# Patient Record
Sex: FEMALE | Race: BLACK OR AFRICAN AMERICAN | Employment: OTHER | URBAN - METROPOLITAN AREA
[De-identification: names, ages, dates, MRNs, and addresses within clinical notes are randomized per-mention and may not be internally consistent; named-entity substitution may affect disease eponyms.]

---

## 2017-01-21 ENCOUNTER — IP HISTORICAL/CONVERTED ENCOUNTER (OUTPATIENT)
Dept: OTHER | Age: 77
End: 2017-01-21

## 2017-03-17 ENCOUNTER — OP HISTORICAL/CONVERTED ENCOUNTER (OUTPATIENT)
Dept: OTHER | Age: 77
End: 2017-03-17

## 2017-12-21 ENCOUNTER — OP HISTORICAL/CONVERTED ENCOUNTER (OUTPATIENT)
Dept: OTHER | Age: 77
End: 2017-12-21

## 2018-03-19 ENCOUNTER — OP HISTORICAL/CONVERTED ENCOUNTER (OUTPATIENT)
Dept: OTHER | Age: 78
End: 2018-03-19

## 2018-12-21 ENCOUNTER — OP HISTORICAL/CONVERTED ENCOUNTER (OUTPATIENT)
Dept: OTHER | Age: 78
End: 2018-12-21

## 2019-03-23 ENCOUNTER — OP HISTORICAL/CONVERTED ENCOUNTER (OUTPATIENT)
Dept: OTHER | Age: 79
End: 2019-03-23

## 2019-10-03 ENCOUNTER — OP HISTORICAL/CONVERTED ENCOUNTER (OUTPATIENT)
Dept: OTHER | Age: 79
End: 2019-10-03

## 2019-10-21 ENCOUNTER — OP HISTORICAL/CONVERTED ENCOUNTER (OUTPATIENT)
Dept: OTHER | Age: 79
End: 2019-10-21

## 2019-11-04 ENCOUNTER — OP HISTORICAL/CONVERTED ENCOUNTER (OUTPATIENT)
Dept: OTHER | Age: 79
End: 2019-11-04

## 2019-12-02 ENCOUNTER — OP HISTORICAL/CONVERTED ENCOUNTER (OUTPATIENT)
Dept: OTHER | Age: 79
End: 2019-12-02

## 2019-12-20 ENCOUNTER — OP HISTORICAL/CONVERTED ENCOUNTER (OUTPATIENT)
Dept: OTHER | Age: 79
End: 2019-12-20

## 2020-07-06 ENCOUNTER — OP HISTORICAL/CONVERTED ENCOUNTER (OUTPATIENT)
Dept: OTHER | Age: 80
End: 2020-07-06

## 2021-01-06 ENCOUNTER — TRANSCRIBE ORDER (OUTPATIENT)
Dept: SCHEDULING | Age: 81
End: 2021-01-06

## 2021-01-06 DIAGNOSIS — Z12.31 VISIT FOR SCREENING MAMMOGRAM: Primary | ICD-10-CM

## 2021-01-12 ENCOUNTER — HOSPITAL ENCOUNTER (OUTPATIENT)
Dept: MAMMOGRAPHY | Age: 81
Discharge: HOME | End: 2021-01-12
Attending: INTERNAL MEDICINE
Payer: MEDICARE

## 2021-01-12 DIAGNOSIS — Z12.31 VISIT FOR SCREENING MAMMOGRAM: ICD-10-CM

## 2021-01-12 PROCEDURE — 77063 BREAST TOMOSYNTHESIS BI: CPT

## 2021-08-17 RX ORDER — SIMVASTATIN 40 MG/1
40 TABLET, FILM COATED ORAL
Qty: 90 TABLET | Refills: 0 | Status: SHIPPED | OUTPATIENT
Start: 2021-08-17 | End: 2021-12-09

## 2021-10-16 PROBLEM — Z95.0 CARDIAC PACEMAKER IN SITU: Status: ACTIVE | Noted: 2021-10-16

## 2021-10-16 PROBLEM — G62.9 NEUROPATHY: Status: ACTIVE | Noted: 2021-10-16

## 2021-10-16 PROBLEM — Z85.09 HISTORY OF GASTROINTESTINAL STROMAL TUMOR (GIST): Status: ACTIVE | Noted: 2021-10-16

## 2021-10-16 PROBLEM — E66.01 MORBID OBESITY (HCC): Status: ACTIVE | Noted: 2021-10-16

## 2021-10-16 PROBLEM — E78.00 PURE HYPERCHOLESTEROLEMIA: Status: ACTIVE | Noted: 2021-10-16

## 2021-10-16 PROBLEM — I10 PRIMARY HYPERTENSION: Status: ACTIVE | Noted: 2021-10-16

## 2021-10-16 PROBLEM — M15.9 PRIMARY OSTEOARTHRITIS INVOLVING MULTIPLE JOINTS: Status: ACTIVE | Noted: 2021-10-16

## 2021-10-16 PROBLEM — I25.2 HISTORY OF NON-ST ELEVATION MYOCARDIAL INFARCTION (NSTEMI): Status: ACTIVE | Noted: 2021-10-16

## 2021-10-16 PROBLEM — Z85.3 HISTORY OF BREAST CANCER: Status: ACTIVE | Noted: 2021-10-16

## 2021-10-21 ENCOUNTER — OFFICE VISIT (OUTPATIENT)
Dept: INTERNAL MEDICINE CLINIC | Age: 81
End: 2021-10-21
Payer: MEDICARE

## 2021-10-21 VITALS
OXYGEN SATURATION: 98 % | DIASTOLIC BLOOD PRESSURE: 82 MMHG | SYSTOLIC BLOOD PRESSURE: 118 MMHG | BODY MASS INDEX: 40.32 KG/M2 | RESPIRATION RATE: 12 BRPM | HEART RATE: 72 BPM | WEIGHT: 242 LBS | HEIGHT: 65 IN

## 2021-10-21 DIAGNOSIS — R63.5 WEIGHT GAIN: ICD-10-CM

## 2021-10-21 DIAGNOSIS — G62.9 NEUROPATHY: ICD-10-CM

## 2021-10-21 DIAGNOSIS — E66.01 MORBID OBESITY (HCC): ICD-10-CM

## 2021-10-21 DIAGNOSIS — M15.9 PRIMARY OSTEOARTHRITIS INVOLVING MULTIPLE JOINTS: ICD-10-CM

## 2021-10-21 DIAGNOSIS — Z95.0 CARDIAC PACEMAKER IN SITU: ICD-10-CM

## 2021-10-21 DIAGNOSIS — I25.2 HISTORY OF NON-ST ELEVATION MYOCARDIAL INFARCTION (NSTEMI): ICD-10-CM

## 2021-10-21 DIAGNOSIS — I10 PRIMARY HYPERTENSION: ICD-10-CM

## 2021-10-21 DIAGNOSIS — Z23 NEEDS FLU SHOT: ICD-10-CM

## 2021-10-21 DIAGNOSIS — E78.00 PURE HYPERCHOLESTEROLEMIA: ICD-10-CM

## 2021-10-21 DIAGNOSIS — E55.9 VITAMIN D DEFICIENCY: ICD-10-CM

## 2021-10-21 DIAGNOSIS — Z85.3 HISTORY OF BREAST CANCER: ICD-10-CM

## 2021-10-21 DIAGNOSIS — Z85.09 HISTORY OF GASTROINTESTINAL STROMAL TUMOR (GIST): ICD-10-CM

## 2021-10-21 PROCEDURE — 1090F PRES/ABSN URINE INCON ASSESS: CPT | Performed by: INTERNAL MEDICINE

## 2021-10-21 PROCEDURE — G8536 NO DOC ELDER MAL SCRN: HCPCS | Performed by: INTERNAL MEDICINE

## 2021-10-21 PROCEDURE — G8510 SCR DEP NEG, NO PLAN REQD: HCPCS | Performed by: INTERNAL MEDICINE

## 2021-10-21 PROCEDURE — G8427 DOCREV CUR MEDS BY ELIG CLIN: HCPCS | Performed by: INTERNAL MEDICINE

## 2021-10-21 PROCEDURE — 1101F PT FALLS ASSESS-DOCD LE1/YR: CPT | Performed by: INTERNAL MEDICINE

## 2021-10-21 PROCEDURE — G8417 CALC BMI ABV UP PARAM F/U: HCPCS | Performed by: INTERNAL MEDICINE

## 2021-10-21 PROCEDURE — G8400 PT W/DXA NO RESULTS DOC: HCPCS | Performed by: INTERNAL MEDICINE

## 2021-10-21 PROCEDURE — 90756 CCIIV4 VACC ABX FREE IM: CPT | Performed by: INTERNAL MEDICINE

## 2021-10-21 PROCEDURE — 99214 OFFICE O/P EST MOD 30 MIN: CPT | Performed by: INTERNAL MEDICINE

## 2021-10-21 RX ORDER — LOSARTAN POTASSIUM 25 MG/1
25 TABLET ORAL DAILY
COMMUNITY
End: 2022-10-21

## 2021-10-21 RX ORDER — ASPIRIN 81 MG/1
81 TABLET ORAL DAILY
COMMUNITY

## 2021-10-21 RX ORDER — BISMUTH SUBSALICYLATE 262 MG
1 TABLET,CHEWABLE ORAL DAILY
COMMUNITY

## 2021-10-21 RX ORDER — CARVEDILOL 12.5 MG/1
12.5 TABLET ORAL 2 TIMES DAILY WITH MEALS
COMMUNITY

## 2021-10-21 RX ORDER — GABAPENTIN 300 MG/1
300 CAPSULE ORAL 4 TIMES DAILY
COMMUNITY
End: 2022-10-21

## 2021-10-21 NOTE — PROGRESS NOTES
Augusta Dean is a 78 y.o. female and presents with Follow Up Chronic Condition, Hypertension, and Cholesterol Problem    Ms. 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 No depression. she is breast cancer survivor she has pacemaker, due to history of any blockage in the past and also having history of longstanding Ms. She has some weight gain and history of vitamin D deficiency she wants to have flu vaccine. She is feeling little drive is up to date for  Covid vaccine. Review of Systems    Review of Systems   Constitutional: Negative. Weight gain   HENT: Negative for sinus pain and sore throat. Eyes: Negative for blurred vision. Respiratory: Negative for cough, shortness of breath and wheezing. Cardiovascular: Negative. Gastrointestinal: Negative. Genitourinary: Negative for frequency and hematuria. Musculoskeletal: Negative for joint pain. Skin: Negative for rash. Neurological: Negative for dizziness, tingling, tremors, sensory change, speech change and headaches. H/o neuropathy after taken chemo in past.   Psychiatric/Behavioral: Negative for depression, substance abuse and suicidal ideas. The patient is not nervous/anxious.          Past Medical History:   Diagnosis Date    Breast cancer ([facility] [address]     High cholesterol     Hypertension     Radiation therapy complication      Past Surgical History:   Procedure Laterality Date    HX MASTECTOMY Left     2012 (+)    HX TUMOR REMOVAL      Stomach      Social History     Socioeconomic History    Marital status:  Spouse name: Not on file    Number of children: Not on file    Years of education: Not on file    Highest education level: Not on file   Tobacco Use    Smoking status: Former Smoker     Packs/day: 0.25     Years: 25.00     Pack years: 6.25     Quit date:      Years since quittin.8    Smokeless tobacco: Never Used   Vaping Use    Vaping Use: Never used   Substance and Sexual Activity    Alcohol use: Not Currently    Drug use: Never    Sexual activity: Not Currently     Social Determinants of Health     Financial Resource Strain:     Difficulty of Paying Living Expenses:    Food Insecurity:     Worried About Running Out of Food in the Last Year:     [address] in the Last Year:    Transportation Needs:     Lack of Transportation (Medical):  Lack of Transportation (Non-Medical):    Physical Activity:     Days of Exercise per Week:     Minutes of Exercise per Session:    Stress:     Feeling of Stress :    Social Connections:     Frequency of Communication with Friends and Family:     Frequency of Social Gatherings with Friends and Family:     Attends [employer] Services:     Active Member of Clubs or Organizations:     Attends Club or Organization Meetings:     Marital Status:      No family history on file. Current Outpatient Medications   Medication Sig Dispense Refill    losartan (COZAAR) 25 mg tablet Take 25 mg by mouth daily.  gabapentin (NEURONTIN) 300 mg capsule Take 300 mg by mouth four (4) times daily. Takes two pill in am and two pills in pm      carvediloL (COREG) 12.5 mg tablet Take 12.5 mg by mouth two (2) times daily (with meals).  aspirin delayed-release 81 mg tablet Take 81 mg by mouth daily.  multivitamin (ONE A DAY) tablet Take 1 Tablet by mouth daily.  simvastatin (ZOCOR) 40 mg tablet Take 1 Tablet by mouth nightly.  90 Tablet 0     No Known Allergies    Objective:  Visit Vitals  /82 (BP 1 Location: Right upper arm, BP Patient Position: Sitting, BP Cuff Size: Large adult)   Pulse 72   Resp 12   Ht 5' 5\" (1.651 m)   Wt 242 lb (109.8 kg)   SpO2 98%   BMI 40.27 kg/m²       Physical Exam:   Constitutional: General Appearance: Pleasant. Level of Distress: NAD. Psychiatric: Mental Status: normal mood and affect Orientation: to time, place, and person. ,normal eye contact. Head: Head: normocephalic and atraumatic. Eyes: Pupils: PERRLA. Sclerae: non-icteric. Neck: Neck: supple, trachea midline, and no masses. Lymph Nodes: no cervical LAD. Thyroid: no enlargement or nodules and non-tender. Lungs: Respiratory effort: no dyspnea. Auscultation: no wheezing, rales/crackles, or rhonchi and breath sounds normal and good air movement. Cardiovascular: Apical Impulse: not displaced. Heart Auscultation: normal S1 and S2; no murmurs, rubs, or gallops; and RRR. Neck vessels: no carotid bruits. Pulses including femoral / pedal: normal throughout. Abdomen: Bowel Sounds: normal. Inspection and Palpation: no tenderness, guarding, or masses and soft and non-distended. Liver: non-tender and no hepatomegaly. Spleen: non-tender and no splenomegaly. Musculoskeletal[de-identified] Extremities: no edema,no varicosities. No Calf tenderness. Neurologic: Gait and Station: normal gait and station. Motor Strength normal right and left. Skin: Inspection and palpation: no rash, lesions, or ulcer. No results found for this or any previous visit. Assessment/Plan:      ICD-10-CM ICD-9-CM    1. Primary hypertension  I10 401.9 CBC WITH AUTOMATED DIFF      METABOLIC PANEL, COMPREHENSIVE      TSH 3RD GENERATION   2. Pure hypercholesterolemia  E78.00 272.0 LIPID PANEL   3. Neuropathy  G62.9 355.9    4. Weight gain  R63.5 783.1 CBC WITH AUTOMATED DIFF      METABOLIC PANEL, COMPREHENSIVE      TSH 3RD GENERATION   5. Vitamin D deficiency  E55.9 268.9 VITAMIN D, 25 HYDROXY   6. History of breast cancer  Z85.3 V10.3    7.  History of gastrointestinal stromal tumor (GIST) Z85.09 V10.09    8. History of non-ST elevation myocardial infarction (NSTEMI)  I25.2 412 LIPID PANEL   9. Morbid obesity (HCC)  E66.01 278.01    10. Cardiac pacemaker in situ  Z95.0 V45.01    11. Primary osteoarthritis involving multiple joints  M89.49 715.98    12. Needs flu shot  Z23 V04.81 INFLUENZA VACCINE (CCIIV4 VACCINE ANTIBIO FREE 0.5 ML)     Orders Placed This Encounter    Influenza Vaccine, QUAD, Vial (Flucelvax VIAL 02023)    CBC WITH AUTOMATED DIFF    METABOLIC PANEL, COMPREHENSIVE    LIPID PANEL    TSH 3RD GENERATION    VITAMIN D, 25 HYDROXY    losartan (COZAAR) 25 mg tablet     Sig: Take 25 mg by mouth daily.  gabapentin (NEURONTIN) 300 mg capsule     Sig: Take 300 mg by mouth four (4) times daily. Takes two pill in am and two pills in pm    carvediloL (COREG) 12.5 mg tablet     Sig: Take 12.5 mg by mouth two (2) times daily (with meals).  aspirin delayed-release 81 mg tablet     Sig: Take 81 mg by mouth daily.  multivitamin (ONE A DAY) tablet     Sig: Take 1 Tablet by mouth daily. Hypertension controlled continue carvedilol 25 mg twice a day,Losartan 25 mg once a day. Diet low in sodium. History of   non-ST elevation MI, no chest pain continue carvedilol, continue low-dose aspirin, continue sumatriptan heart healthy diet try to not gain weight and lose weight with healthy eating habits. Hypercholesterolemia continue simvastatin 40 mg at bedtime with diet low in fat. Labs ordered. Neuropathy. after reading normal for the patient cancer she was tried on Lyrica, 25 mg 3 times a day which she could not tolerate and did not like she is not back on gabapentin 600 mg twice a day. S/p left mastectomy for history of breast cancer under care of oncologist , she follows with breast surgeon also she is under care of neurologist also    History of gastrointestinal stromal tumor s/p surgery. History of sinus arrhythmia and AV block s/p pacemaker.     Weight gain and history of vitamin D deficiency TSH ordered vitamin D level ordered she should  having calorie discipline diet and calorie restricted diet and she is almost sedentary. Flu vaccine given in the office. follow-up in 1 year  Answered all her questions labs ordered. Recommended to keep her mind occupied in constructive activities including, listening to music, doing puzzles, some mild physical exercise weightbearing exercise,, also music therapy and recreational activities    lose weight, follow low fat diet, continue present plan, routine labs ordered, call if any problems    There are no Patient Instructions on file for this visit. Follow-up and Dispositions    · Return in about 1 year (around 10/21/2022) for medical wellness and reg f/u fast lab.

## 2021-10-21 NOTE — PROGRESS NOTES
Chief Complaint   Patient presents with    Follow Up Chronic Condition    Hypertension    Cholesterol Problem     Visit Vitals  /79 (BP 1 Location: Right arm, BP Patient Position: Sitting, BP Cuff Size: Adult)   Pulse 70   Resp 12   Ht 5' 5\" (1.651 m)   Wt 242 lb (109.8 kg)   SpO2 98%   BMI 40.27 kg/m²       1. Have you been to the ER, urgent care clinic since your last visit? Hospitalized since your last visit? No    2. Have you seen or consulted any other health care providers outside of the [address] [place] [address] since your last visit? Include any pap smears or colon screening.  [facility] 06/2021,  08/2021

## 2021-10-23 LAB
25(OH)D3+25(OH)D2 SERPL-MCNC: 29.1 NG/ML (ref 30–100)
ALBUMIN SERPL-MCNC: 4.7 G/DL (ref 3.6–4.6)
ALBUMIN/GLOB SERPL: 1.7 {RATIO} (ref 1.2–2.2)
ALP SERPL-CCNC: 69 IU/L (ref 44–121)
ALT SERPL-CCNC: 15 IU/L (ref 0–32)
AST SERPL-CCNC: 20 IU/L (ref 0–40)
BASOPHILS # BLD AUTO: 0 X10E3/UL (ref 0–0.2)
BASOPHILS NFR BLD AUTO: 1 %
BILIRUB SERPL-MCNC: 0.4 MG/DL (ref 0–1.2)
BUN SERPL-MCNC: 20 MG/DL (ref 8–27)
BUN/CREAT SERPL: 22 (ref 12–28)
CALCIUM SERPL-MCNC: 9.9 MG/DL (ref 8.7–10.3)
CHLORIDE SERPL-SCNC: 102 MMOL/L (ref 96–106)
CHOLEST SERPL-MCNC: 190 MG/DL (ref 100–199)
CO2 SERPL-SCNC: 26 MMOL/L (ref 20–29)
CREAT SERPL-MCNC: 0.92 MG/DL (ref 0.57–1)
EOSINOPHIL # BLD AUTO: 0.1 X10E3/UL (ref 0–0.4)
EOSINOPHIL NFR BLD AUTO: 2 %
ERYTHROCYTE [DISTWIDTH] IN BLOOD BY AUTOMATED COUNT: 15.4 % (ref 11.7–15.4)
GLOBULIN SER CALC-MCNC: 2.7 G/DL (ref 1.5–4.5)
GLUCOSE SERPL-MCNC: 105 MG/DL (ref 65–99)
HCT VFR BLD AUTO: 43.9 % (ref 34–46.6)
HDLC SERPL-MCNC: 59 MG/DL
HGB BLD-MCNC: 13.8 G/DL (ref 11.1–15.9)
IMM GRANULOCYTES # BLD AUTO: 0 X10E3/UL (ref 0–0.1)
IMM GRANULOCYTES NFR BLD AUTO: 0 %
LDLC SERPL CALC-MCNC: 116 MG/DL (ref 0–99)
LYMPHOCYTES # BLD AUTO: 1.4 X10E3/UL (ref 0.7–3.1)
LYMPHOCYTES NFR BLD AUTO: 24 %
MCH RBC QN AUTO: 24 PG (ref 26.6–33)
MCHC RBC AUTO-ENTMCNC: 31.4 G/DL (ref 31.5–35.7)
MCV RBC AUTO: 77 FL (ref 79–97)
MONOCYTES # BLD AUTO: 0.5 X10E3/UL (ref 0.1–0.9)
MONOCYTES NFR BLD AUTO: 9 %
NEUTROPHILS # BLD AUTO: 3.7 X10E3/UL (ref 1.4–7)
NEUTROPHILS NFR BLD AUTO: 64 %
PLATELET # BLD AUTO: 174 X10E3/UL (ref 150–450)
POTASSIUM SERPL-SCNC: 4.9 MMOL/L (ref 3.5–5.2)
PROT SERPL-MCNC: 7.4 G/DL (ref 6–8.5)
RBC # BLD AUTO: 5.74 X10E6/UL (ref 3.77–5.28)
SODIUM SERPL-SCNC: 142 MMOL/L (ref 134–144)
TRIGL SERPL-MCNC: 83 MG/DL (ref 0–149)
TSH SERPL DL<=0.005 MIU/L-ACNC: 0.93 UIU/ML (ref 0.45–4.5)
VLDLC SERPL CALC-MCNC: 15 MG/DL (ref 5–40)
WBC # BLD AUTO: 5.7 X10E3/UL (ref 3.4–10.8)

## 2021-10-24 NOTE — PROGRESS NOTES
Please call Ms.  Rosie Jose that all her labs are stable at her age of 78 please continue over-the-counter vitamin D3 2000 units/day and calcium 600 mg/day and the rest of the labs are good with normal electrolytes hemoglobin is normal and cholesterol is also controlled with simvastatin and kidney function is also normal

## 2021-12-22 ENCOUNTER — TRANSCRIBE ORDER (OUTPATIENT)
Dept: SCHEDULING | Age: 81
End: 2021-12-22

## 2021-12-22 DIAGNOSIS — Z12.31 SCREENING MAMMOGRAM FOR HIGH-RISK PATIENT: Primary | ICD-10-CM

## 2022-01-14 ENCOUNTER — HOSPITAL ENCOUNTER (OUTPATIENT)
Dept: MAMMOGRAPHY | Age: 82
Discharge: HOME | End: 2022-01-14
Attending: INTERNAL MEDICINE
Payer: MEDICARE

## 2022-01-14 DIAGNOSIS — Z12.31 SCREENING MAMMOGRAM FOR HIGH-RISK PATIENT: ICD-10-CM

## 2022-01-14 PROCEDURE — 77067 SCR MAMMO BI INCL CAD: CPT

## 2022-01-14 PROCEDURE — 77063 BREAST TOMOSYNTHESIS BI: CPT

## 2022-03-18 PROBLEM — M15.0 PRIMARY OSTEOARTHRITIS INVOLVING MULTIPLE JOINTS: Status: ACTIVE | Noted: 2021-10-16

## 2022-03-18 PROBLEM — Z85.09 HISTORY OF GASTROINTESTINAL STROMAL TUMOR (GIST): Status: ACTIVE | Noted: 2021-10-16

## 2022-03-18 PROBLEM — M15.9 PRIMARY OSTEOARTHRITIS INVOLVING MULTIPLE JOINTS: Status: ACTIVE | Noted: 2021-10-16

## 2022-03-18 PROBLEM — Z85.3 HISTORY OF BREAST CANCER: Status: ACTIVE | Noted: 2021-10-16

## 2022-03-18 PROBLEM — Z95.0 CARDIAC PACEMAKER IN SITU: Status: ACTIVE | Noted: 2021-10-16

## 2022-03-18 PROBLEM — I10 PRIMARY HYPERTENSION: Status: ACTIVE | Noted: 2021-10-16

## 2022-03-19 PROBLEM — I25.2 HISTORY OF NON-ST ELEVATION MYOCARDIAL INFARCTION (NSTEMI): Status: ACTIVE | Noted: 2021-10-16

## 2022-03-19 PROBLEM — G62.9 NEUROPATHY: Status: ACTIVE | Noted: 2021-10-16

## 2022-03-19 PROBLEM — E78.00 PURE HYPERCHOLESTEROLEMIA: Status: ACTIVE | Noted: 2021-10-16

## 2022-03-20 PROBLEM — E66.01 MORBID OBESITY (HCC): Status: ACTIVE | Noted: 2021-10-16

## 2022-06-09 RX ORDER — SIMVASTATIN 40 MG/1
TABLET, FILM COATED ORAL
Qty: 90 TABLET | Refills: 1 | Status: SHIPPED | OUTPATIENT
Start: 2022-06-09

## 2022-06-22 ENCOUNTER — OFFICE VISIT (OUTPATIENT)
Dept: INTERNAL MEDICINE CLINIC | Age: 82
End: 2022-06-22
Payer: MEDICARE

## 2022-06-22 VITALS
RESPIRATION RATE: 12 BRPM | HEART RATE: 86 BPM | DIASTOLIC BLOOD PRESSURE: 83 MMHG | SYSTOLIC BLOOD PRESSURE: 126 MMHG | WEIGHT: 254 LBS | BODY MASS INDEX: 42.32 KG/M2 | HEIGHT: 65 IN | OXYGEN SATURATION: 96 %

## 2022-06-22 DIAGNOSIS — Z95.0 CARDIAC PACEMAKER IN SITU: ICD-10-CM

## 2022-06-22 DIAGNOSIS — G62.9 NEUROPATHY: ICD-10-CM

## 2022-06-22 DIAGNOSIS — L98.9 SKIN LESIONS: ICD-10-CM

## 2022-06-22 DIAGNOSIS — E66.01 OBESITY, CLASS III, BMI 40-49.9 (MORBID OBESITY) (HCC): ICD-10-CM

## 2022-06-22 DIAGNOSIS — E78.00 PURE HYPERCHOLESTEROLEMIA: ICD-10-CM

## 2022-06-22 DIAGNOSIS — Z85.09 HISTORY OF GASTROINTESTINAL STROMAL TUMOR (GIST): ICD-10-CM

## 2022-06-22 DIAGNOSIS — I10 PRIMARY HYPERTENSION: Primary | ICD-10-CM

## 2022-06-22 DIAGNOSIS — Z85.3 HISTORY OF BREAST CANCER: ICD-10-CM

## 2022-06-22 PROBLEM — E66.813 OBESITY, CLASS III, BMI 40-49.9 (MORBID OBESITY) (HCC): Status: ACTIVE | Noted: 2021-10-16

## 2022-06-22 PROCEDURE — 99214 OFFICE O/P EST MOD 30 MIN: CPT | Performed by: FAMILY MEDICINE

## 2022-06-22 NOTE — PROGRESS NOTES
Chief Complaint   Patient presents with    Follow-up    Other     needing referrals      Visit Vitals  /83 (BP 1 Location: Right upper arm, BP Patient Position: Sitting, BP Cuff Size: Adult)   Pulse 86   Resp 12   Ht 5' 5\" (1.651 m)   Wt 254 lb (115.2 kg)   SpO2 96%   BMI 42.27 kg/m²     1. \"Have you been to the ER, urgent care clinic since your last visit? Hospitalized since your last visit? \" No    2. \"Have you seen or consulted any other health care providers outside of the [address] [place] [address] since your last visit? \" Bubba De Anda (Cardiology) 06/2022 Yearly visit      3. For patients aged 39-70: Has the patient had a colonoscopy / FIT/ Cologuard? NA - based on age      If the patient is female:    4. For patients aged 41-77: Has the patient had a mammogram within the past 2 years? NA - based on age or sex      11. For patients aged 21-65: Has the patient had a pap smear?  NA - based on age or sex

## 2022-06-22 NOTE — PROGRESS NOTES
Mariana Meza (: 1940) is a 78 y.o. female, established patient, here for evaluation of the following chief complaint(s):  Follow-up and Other (needing referrals )       ASSESSMENT/PLAN:  Below is the assessment and plan developed based on review of pertinent history, physical exam, labs, studies, and medications. 1. Primary hypertension  Chronic, controlled  -     CBC WITH AUTOMATED DIFF  -     METABOLIC PANEL, COMPREHENSIVE  -     LIPID PANEL  Continue blood pressure medications without change, discussed low calorie diet, discussed exercise for weight loss. I have put in a referral to weight loss, unsure if such a provider is part of [employer] locally. Patient does not want to travel far and is in desperate need of weight loss coaching. I have offered my services at the [address] [place] [address] location and also suggested she speak to her PCP in detail about anorexiants if she plans on discussing further this further with PCP. 2. Pure hypercholesterolemia  Chronic  -     LIPID PANEL  LDL above goal, lipid panel patient is already taking simvastatin 40 mg daily. 3. Neuropathy  Chronic  Continue gabapentin. 4. Skin lesions  Chronic  Because these lesions are irritative, patient will schedule an appointment with me at the [facility] location to have them removed and sent for pathology. 5. Cardiac pacemaker in situ  Chronic  6. History of breast cancer  7. History of gastrointestinal stromal tumor (GIST)  8. Obesity, Class III, BMI 40-49.9 (morbid obesity) (HCC)  -     REFERRAL TO WEIGHT LOSS      Return in about 6 months (around 2022) for chronic care follow up. SUBJECTIVE/OBJECTIVE:  HPI    18-year-old female history of hypertension, hypercholesterolemia, cardiac pacemaker, history of left mastectomy, history of GIST presents to the office for routine office follow-up for chronic medical problems. Patient is concerned about weight gain.   Patient admits she overeats and has explored many weight loss programs but has not found one that suits her needs. Her son even ordered healthy meals through a online supplier. Patient states she did rather have someone locally prepare her meals and deliver them. Patient states her blood pressure is similar at home to her in-office reading. She does follow-up with oncology annually for history of breast cancer and GIST. She also sees Dr. Judy Butcher in [place] for history of MI status post pacemaker 2017. She has received radiation and chemotherapy as part of her cancer treatments. She developed neuropathy in her anterior lateral leg, diagnosed previously as meralgia paresthetica, gabapentin seems to provide enough pain pain relief that patient can function daily. Patient would also like skin lesions on her neck removed that have become irritated on articles of clothing. No Known Allergies  Current Outpatient Medications   Medication Sig    simvastatin (ZOCOR) 40 mg tablet TAKE 1 TABLET BY MOUTH EVERY DAY AT NIGHT    losartan (COZAAR) 25 mg tablet Take 25 mg by mouth daily.  gabapentin (NEURONTIN) 300 mg capsule Take 300 mg by mouth four (4) times daily. Takes two pill in am and two pills in pm    carvediloL (COREG) 12.5 mg tablet Take 12.5 mg by mouth two (2) times daily (with meals).  aspirin delayed-release 81 mg tablet Take 81 mg by mouth daily.  multivitamin (ONE A DAY) tablet Take 1 Tablet by mouth daily. No current facility-administered medications for this visit. Past Medical History:   Diagnosis Date    Breast cancer ([facility] [address] 2012    High cholesterol     Hypertension     Radiation therapy complication      Past Surgical History:   Procedure Laterality Date    HX MASTECTOMY Left     2012 (+)    HX TUMOR REMOVAL      Stomach      History reviewed. No pertinent family history.   Social History     Tobacco Use   Smoking Status Former Smoker    Packs/day: 0.25    Years: 25.00    Pack years: 6.25    Quit date: 12    Years since quittin.4   Smokeless Tobacco Never Used         Review of Systems   All other systems reviewed and are negative. /83 (BP 1 Location: Right upper arm, BP Patient Position: Sitting, BP Cuff Size: Adult)   Pulse 86   Resp 12   Ht 5' 5\" (1.651 m)   Wt 254 lb (115.2 kg)   SpO2 96%   BMI 42.27 kg/m²    Physical Exam  Vitals reviewed. Constitutional:       Appearance: She is obese. HENT:      Head: Normocephalic and atraumatic. Neck:      Vascular: No carotid bruit. Cardiovascular:      Rate and Rhythm: Normal rate and regular rhythm. Pulses: Normal pulses. Heart sounds: Normal heart sounds. Pulmonary:      Effort: Pulmonary effort is normal.      Breath sounds: Normal breath sounds. Abdominal:      General: Bowel sounds are normal.      Palpations: Abdomen is soft. Musculoskeletal:      Cervical back: Neck supple. Right lower leg: No edema. Left lower leg: No edema. Skin:     General: Skin is warm. Capillary Refill: Capillary refill takes less than 2 seconds. Comments: Skin lesion, neck area   Neurological:      Mental Status: She is alert. Mental status is at baseline. Psychiatric:         Mood and Affect: Mood normal.             On this date 2022 I have spent 30 minutes reviewing previous notes, test results and face to face with the patient discussing the diagnosis and importance of compliance with the treatment plan as well as documenting on the day of the visit. An electronic signature was used to authenticate this note.   -- MD M[id number]  [address]

## 2022-06-23 LAB
ALBUMIN SERPL-MCNC: 4.5 G/DL (ref 3.6–4.6)
ALBUMIN/GLOB SERPL: 1.9 {RATIO} (ref 1.2–2.2)
ALP SERPL-CCNC: 76 IU/L (ref 44–121)
ALT SERPL-CCNC: 18 IU/L (ref 0–32)
AST SERPL-CCNC: 17 IU/L (ref 0–40)
BASOPHILS # BLD AUTO: 0.1 X10E3/UL (ref 0–0.2)
BASOPHILS NFR BLD AUTO: 1 %
BILIRUB SERPL-MCNC: 0.3 MG/DL (ref 0–1.2)
BUN SERPL-MCNC: 14 MG/DL (ref 8–27)
BUN/CREAT SERPL: 17 (ref 12–28)
CALCIUM SERPL-MCNC: 9.5 MG/DL (ref 8.7–10.3)
CHLORIDE SERPL-SCNC: 100 MMOL/L (ref 96–106)
CHOLEST SERPL-MCNC: 180 MG/DL (ref 100–199)
CO2 SERPL-SCNC: 24 MMOL/L (ref 20–29)
CREAT SERPL-MCNC: 0.84 MG/DL (ref 0.57–1)
EGFR: 69 ML/MIN/1.73
EOSINOPHIL # BLD AUTO: 0.1 X10E3/UL (ref 0–0.4)
EOSINOPHIL NFR BLD AUTO: 2 %
ERYTHROCYTE [DISTWIDTH] IN BLOOD BY AUTOMATED COUNT: 15.4 % (ref 11.7–15.4)
GLOBULIN SER CALC-MCNC: 2.4 G/DL (ref 1.5–4.5)
GLUCOSE SERPL-MCNC: 109 MG/DL (ref 65–99)
HCT VFR BLD AUTO: 42.8 % (ref 34–46.6)
HDLC SERPL-MCNC: 58 MG/DL
HGB BLD-MCNC: 13.6 G/DL (ref 11.1–15.9)
IMM GRANULOCYTES # BLD AUTO: 0 X10E3/UL (ref 0–0.1)
IMM GRANULOCYTES NFR BLD AUTO: 0 %
LDLC SERPL CALC-MCNC: 105 MG/DL (ref 0–99)
LYMPHOCYTES # BLD AUTO: 1 X10E3/UL (ref 0.7–3.1)
LYMPHOCYTES NFR BLD AUTO: 19 %
MCH RBC QN AUTO: 24.4 PG (ref 26.6–33)
MCHC RBC AUTO-ENTMCNC: 31.8 G/DL (ref 31.5–35.7)
MCV RBC AUTO: 77 FL (ref 79–97)
MONOCYTES # BLD AUTO: 0.4 X10E3/UL (ref 0.1–0.9)
MONOCYTES NFR BLD AUTO: 8 %
NEUTROPHILS # BLD AUTO: 3.8 X10E3/UL (ref 1.4–7)
NEUTROPHILS NFR BLD AUTO: 70 %
PLATELET # BLD AUTO: 170 X10E3/UL (ref 150–450)
POTASSIUM SERPL-SCNC: 4.3 MMOL/L (ref 3.5–5.2)
PROT SERPL-MCNC: 6.9 G/DL (ref 6–8.5)
RBC # BLD AUTO: 5.57 X10E6/UL (ref 3.77–5.28)
SODIUM SERPL-SCNC: 138 MMOL/L (ref 134–144)
TRIGL SERPL-MCNC: 92 MG/DL (ref 0–149)
VLDLC SERPL CALC-MCNC: 17 MG/DL (ref 5–40)
WBC # BLD AUTO: 5.4 X10E3/UL (ref 3.4–10.8)

## 2022-10-15 PROBLEM — N95.9 MENOPAUSAL AND PERIMENOPAUSAL DISORDER: Status: ACTIVE | Noted: 2022-10-15

## 2022-10-21 ENCOUNTER — OFFICE VISIT (OUTPATIENT)
Dept: INTERNAL MEDICINE CLINIC | Age: 82
End: 2022-10-21
Payer: COMMERCIAL

## 2022-10-21 VITALS
OXYGEN SATURATION: 97 % | DIASTOLIC BLOOD PRESSURE: 64 MMHG | SYSTOLIC BLOOD PRESSURE: 108 MMHG | RESPIRATION RATE: 17 BRPM | HEART RATE: 60 BPM | TEMPERATURE: 97.8 F | HEIGHT: 65 IN | WEIGHT: 250 LBS | BODY MASS INDEX: 41.65 KG/M2

## 2022-10-21 DIAGNOSIS — R68.89 FORGETFULNESS: ICD-10-CM

## 2022-10-21 DIAGNOSIS — E78.00 PURE HYPERCHOLESTEROLEMIA: ICD-10-CM

## 2022-10-21 DIAGNOSIS — I10 PRIMARY HYPERTENSION: ICD-10-CM

## 2022-10-21 DIAGNOSIS — E66.01 OBESITY, CLASS III, BMI 40-49.9 (MORBID OBESITY) (HCC): ICD-10-CM

## 2022-10-21 DIAGNOSIS — Z95.0 CARDIAC PACEMAKER IN SITU: ICD-10-CM

## 2022-10-21 DIAGNOSIS — I25.2 HISTORY OF NON-ST ELEVATION MYOCARDIAL INFARCTION (NSTEMI): ICD-10-CM

## 2022-10-21 DIAGNOSIS — Z85.3 HISTORY OF BREAST CANCER: ICD-10-CM

## 2022-10-21 DIAGNOSIS — G62.9 NEUROPATHY: ICD-10-CM

## 2022-10-21 DIAGNOSIS — N95.9 MENOPAUSAL AND PERIMENOPAUSAL DISORDER: ICD-10-CM

## 2022-10-21 DIAGNOSIS — Z00.00 MEDICARE ANNUAL WELLNESS VISIT, SUBSEQUENT: ICD-10-CM

## 2022-10-21 DIAGNOSIS — I95.2 HYPOTENSION DUE TO DRUGS: ICD-10-CM

## 2022-10-21 DIAGNOSIS — Z85.09 HISTORY OF GASTROINTESTINAL STROMAL TUMOR (GIST): ICD-10-CM

## 2022-10-21 PROCEDURE — 1090F PRES/ABSN URINE INCON ASSESS: CPT | Performed by: INTERNAL MEDICINE

## 2022-10-21 PROCEDURE — G8427 DOCREV CUR MEDS BY ELIG CLIN: HCPCS | Performed by: INTERNAL MEDICINE

## 2022-10-21 PROCEDURE — G8752 SYS BP LESS 140: HCPCS | Performed by: INTERNAL MEDICINE

## 2022-10-21 PROCEDURE — 1101F PT FALLS ASSESS-DOCD LE1/YR: CPT | Performed by: INTERNAL MEDICINE

## 2022-10-21 PROCEDURE — G8536 NO DOC ELDER MAL SCRN: HCPCS | Performed by: INTERNAL MEDICINE

## 2022-10-21 PROCEDURE — 99214 OFFICE O/P EST MOD 30 MIN: CPT | Performed by: INTERNAL MEDICINE

## 2022-10-21 PROCEDURE — G8400 PT W/DXA NO RESULTS DOC: HCPCS | Performed by: INTERNAL MEDICINE

## 2022-10-21 PROCEDURE — G8754 DIAS BP LESS 90: HCPCS | Performed by: INTERNAL MEDICINE

## 2022-10-21 PROCEDURE — G0439 PPPS, SUBSEQ VISIT: HCPCS | Performed by: INTERNAL MEDICINE

## 2022-10-21 PROCEDURE — G8510 SCR DEP NEG, NO PLAN REQD: HCPCS | Performed by: INTERNAL MEDICINE

## 2022-10-21 PROCEDURE — G8417 CALC BMI ABV UP PARAM F/U: HCPCS | Performed by: INTERNAL MEDICINE

## 2022-10-21 NOTE — PATIENT INSTRUCTIONS

## 2022-10-21 NOTE — PROGRESS NOTES
Mariana Alston (: 1940) is a 78 y.o. female, established patient, here for evaluation of the following chief complaint(s):  Follow Up Chronic Condition, Labs, and Annual Wellness Visit         ASSESSMENT/PLAN:  Below is the assessment and plan developed based on review of pertinent history, physical exam, labs, studies, and medications. 1. Primary hypertension  2. Pure hypercholesterolemia  3. Neuropathy  4. Cardiac pacemaker in situ  5. History of breast cancer  6. History of gastrointestinal stromal tumor (GIST)  7. Obesity, Class III, BMI 40-49.9 (morbid obesity) ([facility] [address]  8. History of non-ST elevation myocardial infarction (NSTEMI)  9. Menopausal and perimenopausal disorder  10. Medicare annual wellness visit, subsequent      Meralgia inj   No follow-ups on file. Hypertension blood pressure is running on lower side recommended to stop losartan 25 mg/day continued on carvedilol 12.5 mg 12 hourly. Diet low in sodium. She has no dizziness. Hypercholesteremia getting simvastatin 40 mg at bedtime. Labs ordered    According to her she has received injection in her right hip joint and she had meralgia paresthetica. She was prescribed gabapentin was taking 1200 mg/day by orthopedic surgeon she could not recall the name of the doctor. She says she has stopped gradually because it was not helping. Breast cancer survivor follows medical oncologist and currently off from gabapentin    History of AV block s/p pacemaker follows cardiologist    History of GIST under the care of hematologist    History of non-ST elevation MI follows cardiologist    Does not want to do a bone density for now    Her  is concerned for the forgetfulness she is already follows neurologist Dr. Matt Knight so recommended to see and discussed with forgetfulness. Patient states she does not want to be on medicines. She also drives and today patient came with her .     She all the time keeps eating and she has weight gain and BMI 41.60 she wanted to do something, I gave all the names and phone number for the resources for losing weight with behavioral therapy and with the nonsurgical way and recommended to stop drinking sugar containing beverages like regular soda juice and sugar containing and starch containing desserts ice creams cupcakes and crackers and avoid snacking and she should not eat unless she is hungry and she has to take care of her pantry and her  is buying grocery explained him to my best ability and gave phone number and she appreciated    Follow-up in 4 months. SUBJECTIVE/OBJECTIVE:    HPI:  Ms. Delia Rosario came with her  for regular follow-up. Her blood pressure is running on lower side manually and with machine recommended to stop her losartan. She is not having dizziness. She follows cardiologist, neurologist and hematologist/oncologist.  She is compliant for her medicines. Taking carvedilol and simvastatin. She has weight gain by keep eating all the time. According to her  and they have 58 years of marriage life who is 26-year-old Mr. Sawyer Orozco came with her and he was complaining that Ms. Delia Rosario is keep eating snacking all the time and also she has forgetfulness. She is already following neurologist and she should bring that attention to the neurologist regarding neuro checkup for forgetfulness patient does not want to be on medicine no major accidents occurred. No depression. No chest pain no shortness of breath she has history of pacemaker due to history of AV block H. And she had history of non-ST elevation MI continue vitamin D3 calcium. She has stopped taking gabapentin. She was following orthopedic surgeon. She has been off from gabapentin for last 4 weeks. Review of Systems   Constitutional: Negative. HENT:  Negative for sore throat. Eyes:  Negative for blurred vision. Respiratory:  Negative for cough, shortness of breath and wheezing.     Cardiovascular: Negative. Gastrointestinal: Negative. Genitourinary:  Negative for dysuria, frequency and hematuria. Musculoskeletal: Negative. Neurological:  Negative for dizziness, tingling, tremors and headaches. Psychiatric/Behavioral: Negative. Vitals:    10/21/22 0908   BP: 101/64   Pulse: 76   Resp: 17   Temp: 97.8 °F (36.6 °C)   TempSrc: Oral   SpO2: 97%   Weight: 250 lb (113.4 kg)   Height: 5' 5\" (1.651 m)      Physical Exam  Vitals and nursing note reviewed. Constitutional:       General: She is not in acute distress. Appearance: Normal appearance. She is obese. She is not toxic-appearing. Eyes:      Pupils: Pupils are equal, round, and reactive to light. Cardiovascular:      Rate and Rhythm: Normal rate and regular rhythm. Pulses: Normal pulses. Heart sounds: Normal heart sounds. No murmur heard. Pulmonary:      Effort: Pulmonary effort is normal.      Breath sounds: Normal breath sounds. No wheezing or rhonchi. Abdominal:      General: Bowel sounds are normal. There is no distension. Palpations: Abdomen is soft. Tenderness: There is no abdominal tenderness. Musculoskeletal:      Cervical back: Neck supple. Right lower leg: No edema. Left lower leg: No edema. Skin:     General: Skin is warm. Neurological:      General: No focal deficit present. Mental Status: She is oriented to person, place, and time. Mental status is at baseline. Motor: No weakness. Gait: Gait normal.   Psychiatric:         Mood and Affect: Mood normal.         Behavior: Behavior normal.       On this date 10/21/2022 I have spent 45 minutes reviewing previous notes, test results and face to face with the patient discussing the diagnosis and importance of compliance with the treatment plan as well as documenting on the day of the visit.     Signed by:  Johnathan Martinez MD

## 2022-10-21 NOTE — PROGRESS NOTES
1. \"Have you been to the ER, urgent care clinic since your last visit? Hospitalized since your last visit? \" Yes When: 2022 Where: [facility] Reason for visit: bug bite    2. \"Have you seen or consulted any other health care providers outside of the [address] [place] [address] since your last visit? \" No     3. For patients aged 39-70: Has the patient had a colonoscopy / FIT/ Cologuard? NA - based on age      If the patient is female:    4. For patients aged 41-77: Has the patient had a mammogram within the past 2 years? NA - based on age or sex      11. For patients aged 21-65: Has the patient had a pap smear? NA - based on age or sex      Chief Complaint   Patient presents with    Follow Up Chronic Condition    Labs    Annual Wellness Visit             This is the Subsequent Medicare Annual Wellness Exam, performed 12 months or more after the Initial AWV or the last Subsequent AWV    I have reviewed the patient's medical history in detail and updated the computerized patient record. She has no depression. Able to recall the 3 words and clock drawing but  is complaining that she has forgetfulness for everything. Also complaining of all the time eating. Patient wants resources to discuss about weight loss that I gave her    She is non-smoker nonalcoholic. She is breast cancer survivor. She has no hearing or vision complaints. She walks unassisted without any DME. Discussed about advanced directory. Discussed about age-appropriate preventive vaccinations including Tdap or Td also Shingrix vaccine and Pneumovax 23 and new COVID-vaccine. According to her she has taken her flu vaccine. Assessment/Plan   Education and counseling provided:  Are appropriate based on today's review and evaluation  End-of-Life planning (with patient's consent)  Pneumococcal Vaccine  Influenza Vaccine  Bone mass measurement (DEXA)  Screening for glaucoma    1. Medicare annual wellness visit, subsequent  2.  Primary hypertension  -     CBC WITH AUTOMATED DIFF  -     METABOLIC PANEL, COMPREHENSIVE  3. Pure hypercholesterolemia  -     LIPID PANEL  4. Forgetfulness  -     REFERRAL TO NEUROLOGY  -     CBC WITH AUTOMATED DIFF  -     METABOLIC PANEL, COMPREHENSIVE  -     TSH RFX ON ABNORMAL TO FREE T4  -     VITAMIN B12  5. Hypotension due to drugs  6. Obesity, Class III, BMI 40-49.9 (morbid obesity) ([facility] [address]  7. Neuropathy  8. Cardiac pacemaker in situ  9. History of breast cancer  10. History of gastrointestinal stromal tumor (GIST)  11. History of non-ST elevation myocardial infarction (NSTEMI)  12. Menopausal and perimenopausal disorder       Depression Risk Factor Screening     3 most recent PHQ Screens 10/21/2022   Little interest or pleasure in doing things Not at all   Feeling down, depressed, irritable, or hopeless Not at all   Total Score PHQ 2 0   Trouble falling or staying asleep, or sleeping too much -   Feeling tired or having little energy -   Poor appetite, weight loss, or overeating -   Feeling bad about yourself - or that you are a failure or have let yourself or your family down -   Trouble concentrating on things such as school, work, reading, or watching TV -   Moving or speaking so slowly that other people could have noticed; or the opposite being so fidgety that others notice -   Thoughts of being better off dead, or hurting yourself in some way -   PHQ 9 Score -   How difficult have these problems made it for you to do your work, take care of your home and get along with others -       Alcohol & Drug Abuse Risk Screen    Do you average more than 1 drink per night or more than 7 drinks a week:  No    On any one occasion in the past three months have you have had more than 3 drinks containing alcohol:  No          Functional Ability and Level of Safety    Hearing: Hearing is good. Activities of Daily Living:   The home contains: handrails and grab bars  Patient does total self care      Ambulation: with no difficulty     Fall Risk:  Fall Risk Assessment, last 12 mths 10/21/2022   Able to walk? Yes   Fall in past 12 months? 0   Do you feel unsteady?  0   Are you worried about falling 0      Abuse Screen:  Patient is not abused       Cognitive Screening    Has your family/caregiver stated any concerns about your memory: no     Cognitive Screening: Normal - Clock Drawing Test, Mini Cog Test    Health Maintenance Due     Health Maintenance Due   Topic Date Due    DTaP/Tdap/Td series (1 - Tdap) Never done    Shingrix Vaccine Age 50> (1 of 2) Never done    Bone Densitometry (Dexa) Screening  Never done    Pneumococcal 65+ years (2 - PCV) 11/22/2021    COVID-19 Vaccine (4 - Booster for Clarene Igor series) 03/30/2022       Patient Care Team   Patient Care Team:  Alvina Swanson MD as PCP - General (Internal Medicine Physician)  Alvina Swanson MD as PCP - [facility]  Mikey Gentile MD (Cardiovascular Disease Physician)    History     Patient Active Problem List   Diagnosis Code    Pure hypercholesterolemia E78.00    Primary hypertension I10    History of breast cancer Z85.3    History of gastrointestinal stromal tumor (GIST) Z85.09    History of non-ST elevation myocardial infarction (NSTEMI) I25.2    Neuropathy G62.9    Obesity, Class III, BMI 40-49.9 (morbid obesity) ([facility] [address] E66.01    Cardiac pacemaker in situ Z95.0    Primary osteoarthritis involving multiple joints M15.9    Skin lesions L98.9    Menopausal and perimenopausal disorder N95.9    Hypotension due to drugs I95.2     Past Medical History:   Diagnosis Date    AMD (age related macular degeneration) 06/28/2022    Breast cancer ([facility] [address] 2012    High cholesterol     Hypertension     Radiation therapy complication       Past Surgical History:   Procedure Laterality Date    HX MASTECTOMY Left     2012 (+)    HX TUMOR REMOVAL      Stomach      Current Outpatient Medications   Medication Sig Dispense Refill    simvastatin (ZOCOR) 40 mg tablet TAKE 1 TABLET BY MOUTH EVERY DAY AT NIGHT 90 Tablet 1    carvediloL (COREG) 12.5 mg tablet Take 12.5 mg by mouth two (2) times daily (with meals). aspirin delayed-release 81 mg tablet Take 81 mg by mouth daily. multivitamin (ONE A DAY) tablet Take 1 Tablet by mouth daily. No Known Allergies    History reviewed. No pertinent family history. Signed by  Jyoti Krishnamurthy MD.

## 2022-12-09 RX ORDER — SIMVASTATIN 40 MG/1
TABLET, FILM COATED ORAL
Qty: 90 TABLET | Refills: 1 | Status: SHIPPED | OUTPATIENT
Start: 2022-12-09

## 2023-01-05 ENCOUNTER — TRANSCRIBE ORDER (OUTPATIENT)
Dept: SCHEDULING | Age: 83
End: 2023-01-05

## 2023-01-05 DIAGNOSIS — Z12.31 OTHER SCREENING MAMMOGRAM: Primary | ICD-10-CM

## 2023-01-19 ENCOUNTER — HOSPITAL ENCOUNTER (OUTPATIENT)
Dept: MAMMOGRAPHY | Age: 83
Discharge: HOME | End: 2023-01-19
Attending: INTERNAL MEDICINE
Payer: MEDICARE

## 2023-01-19 DIAGNOSIS — Z12.31 OTHER SCREENING MAMMOGRAM: ICD-10-CM

## 2023-01-19 PROCEDURE — 77063 BREAST TOMOSYNTHESIS BI: CPT

## 2023-01-21 NOTE — PROGRESS NOTES
Please inform Ms. Cleo Umanzor right unilateral screening mammogram is normal no evidence of malignancy next screening mammogram is in 1 year

## 2023-02-24 ENCOUNTER — OFFICE VISIT (OUTPATIENT)
Dept: INTERNAL MEDICINE CLINIC | Age: 83
End: 2023-02-24
Payer: MEDICARE

## 2023-02-24 VITALS
OXYGEN SATURATION: 97 % | SYSTOLIC BLOOD PRESSURE: 122 MMHG | WEIGHT: 236.4 LBS | RESPIRATION RATE: 18 BRPM | DIASTOLIC BLOOD PRESSURE: 78 MMHG | HEIGHT: 64 IN | TEMPERATURE: 98.2 F | BODY MASS INDEX: 40.36 KG/M2 | HEART RATE: 80 BPM

## 2023-02-24 DIAGNOSIS — Z85.3 HISTORY OF BREAST CANCER: ICD-10-CM

## 2023-02-24 DIAGNOSIS — Z85.09 HISTORY OF GASTROINTESTINAL STROMAL TUMOR (GIST): ICD-10-CM

## 2023-02-24 DIAGNOSIS — R21 RASH AND NONSPECIFIC SKIN ERUPTION: ICD-10-CM

## 2023-02-24 DIAGNOSIS — L03.116 CELLULITIS OF LEFT LOWER EXTREMITY: ICD-10-CM

## 2023-02-24 DIAGNOSIS — E66.01 OBESITY, CLASS III, BMI 40-49.9 (MORBID OBESITY) (HCC): ICD-10-CM

## 2023-02-24 DIAGNOSIS — I25.2 HISTORY OF NON-ST ELEVATION MYOCARDIAL INFARCTION (NSTEMI): ICD-10-CM

## 2023-02-24 DIAGNOSIS — Z95.0 CARDIAC PACEMAKER IN SITU: ICD-10-CM

## 2023-02-24 DIAGNOSIS — E78.00 PURE HYPERCHOLESTEROLEMIA: ICD-10-CM

## 2023-02-24 DIAGNOSIS — N95.9 MENOPAUSAL AND PERIMENOPAUSAL DISORDER: ICD-10-CM

## 2023-02-24 DIAGNOSIS — I10 PRIMARY HYPERTENSION: Primary | ICD-10-CM

## 2023-02-24 PROCEDURE — G8536 NO DOC ELDER MAL SCRN: HCPCS | Performed by: INTERNAL MEDICINE

## 2023-02-24 PROCEDURE — 1090F PRES/ABSN URINE INCON ASSESS: CPT | Performed by: INTERNAL MEDICINE

## 2023-02-24 PROCEDURE — G8510 SCR DEP NEG, NO PLAN REQD: HCPCS | Performed by: INTERNAL MEDICINE

## 2023-02-24 PROCEDURE — G8417 CALC BMI ABV UP PARAM F/U: HCPCS | Performed by: INTERNAL MEDICINE

## 2023-02-24 PROCEDURE — G8400 PT W/DXA NO RESULTS DOC: HCPCS | Performed by: INTERNAL MEDICINE

## 2023-02-24 PROCEDURE — 1101F PT FALLS ASSESS-DOCD LE1/YR: CPT | Performed by: INTERNAL MEDICINE

## 2023-02-24 PROCEDURE — 99214 OFFICE O/P EST MOD 30 MIN: CPT | Performed by: INTERNAL MEDICINE

## 2023-02-24 PROCEDURE — G8427 DOCREV CUR MEDS BY ELIG CLIN: HCPCS | Performed by: INTERNAL MEDICINE

## 2023-02-24 RX ORDER — AMOXICILLIN AND CLAVULANATE POTASSIUM 500; 125 MG/1; MG/1
1 TABLET, FILM COATED ORAL 2 TIMES DAILY
Qty: 14 TABLET | Refills: 0 | Status: SHIPPED | OUTPATIENT
Start: 2023-02-24

## 2023-02-24 RX ORDER — TRIAMCINOLONE ACETONIDE 1 MG/G
CREAM TOPICAL 2 TIMES DAILY
Qty: 45 G | Refills: 0 | Status: SHIPPED | OUTPATIENT
Start: 2023-02-24

## 2023-02-24 RX ORDER — MUPIROCIN 20 MG/G
OINTMENT TOPICAL
COMMUNITY
Start: 2023-01-24 | End: 2023-02-24 | Stop reason: ALTCHOICE

## 2023-02-24 RX ORDER — FUROSEMIDE 20 MG/1
20 TABLET ORAL
COMMUNITY

## 2023-02-24 NOTE — PROGRESS NOTES
1. \"Have you been to the ER, urgent care clinic since your last visit? Hospitalized since your last visit? \" No    2. \"Have you seen or consulted any other health care providers outside of the [address] [place] [address] since your last visit? \" Yes When: October 2022 [facility]: [facility]  Reason for visit: thigh pain      3. For patients aged 39-70: Has the patient had a colonoscopy / FIT/ Cologuard? NA - based on age      If the patient is female:    4. For patients aged 41-77: Has the patient had a mammogram within the past 2 years? NA - based on age or sex      11. For patients aged 21-65: Has the patient had a pap smear? NA - based on age or sex    .   Chief Complaint   Patient presents with    Follow Up Chronic Condition    Hypertension    Cholesterol Problem    Morbid Obesity     Visit Vitals  /79 (BP 1 Location: Right upper arm, BP Patient Position: Sitting, BP Cuff Size: Adult)   Pulse 82   Temp 98.2 °F (36.8 °C) (Oral)   Resp 18   Ht 5' 4\" (1.626 m)   Wt 236 lb 6.4 oz (107.2 kg)   SpO2 97%   BMI 40.58 kg/m²

## 2023-02-24 NOTE — PROGRESS NOTES
Mariana Reynolds (: 1940) is a 78 y.o. female, established patient, here for evaluation of the following chief complaint(s):  Follow Up Chronic Condition, Hypertension, Cholesterol Problem, and Morbid Obesity         ASSESSMENT/PLAN:  Below is the assessment and plan developed based on review of pertinent history, physical exam, labs, studies, and medications. 1. Primary hypertension  2. Pure hypercholesterolemia  3. Obesity, Class III, BMI 40-49.9 (morbid obesity) ([address]  4. Rash and nonspecific skin eruption  5. Cellulitis of left lower extremity  6. Cardiac pacemaker in situ  7. History of breast cancer  8. History of non-ST elevation myocardial infarction (NSTEMI)  9. History of gastrointestinal stromal tumor (GIST)  10. Menopausal and perimenopausal disorder    Hypertension controlled. Continue carvedilol 12.5 mg twice a day, furosemide 20 mg/day. Hypercholesteremia controlled at her age continued on simvastatin 40 mg at bedtime. Hyperpigmented skin changes on the left lower leg may be due to venous stasis and she is already on furosemide 20 mg and she takes every other day. Keep legs propped up position she can use triamcinolone cream and for precaution as I mentioned in HPI started on amoxicillin clavulanic acid for 1 week clinically does not look like fully cellulitis. No calf tenderness. No signs and symptoms of blood clot. She has morbid obesity started attending weight loss clinic and has cut back carbohydrate her daughter helps and she has done intentional weight loss    History of non-ST elevation MI and history of AV block having pacemaker. History of breast cancer survivor and history of GIST. Used to see oncologist she has stopped seeing neurologist she has stopped taking gabapentin. Continue vitamin D3. Lab results explained to her. Refills given. No depression.       Return in about 6 months (around 2023) for follow up for chronic condition. SUBJECTIVE/OBJECTIVE:  HPI    [address] with pleasant personality came for regular follow-up. .  She says that she is thankful for referring to weight loss clinic and she has done intentional weight loss and also being on furosemide 20 mg/day started by Dr. Chiquita Quiles in the past.  She has stopped seeing neurologist because she no more takes gabapentin for neuropathy from chemo that she had in the past.  She is a breast cancer survivor. Her blood pressure is well controlled. No dizziness. She has pain in her left lower leg at the site of eczematous skin changes. Clinically does not look like cellulitis and as such there is no pain while walking but when I did deep palpation she was hurting so given for precaution 1 week antibiotic and steroid cream for hyperpigmented skin changes. She says her daughter is helping to do virtual visit with [facility] weight loss clinic. She takes statin. She has done her labs. Discussed with her in length. She says she follows podiatrist and she was given mupirocin cream to apply to the toe. She has cut back eating carbs in the diet. No depression. She has good support from her  who is waiting in the waiting room and who was last time present with her in the room. No Known Allergies  Current Outpatient Medications   Medication Sig    furosemide (LASIX) 20 mg tablet 20 mg. Takes every other day by dr mcintyre/cardiologist.    amoxicillin-clavulanate (AUGMENTIN) 500-125 mg per tablet Take 1 Tablet by mouth two (2) times a day. With food. triamcinolone acetonide (KENALOG) 0.1 % topical cream Apply  to affected area two (2) times a day. Apply twice a dayf or 2 weeks on lt leg then as needed. simvastatin (ZOCOR) 40 mg tablet TAKE 1 TABLET BY MOUTH EVERY DAY AT NIGHT    carvediloL (COREG) 12.5 mg tablet Take 12.5 mg by mouth two (2) times daily (with meals). aspirin delayed-release 81 mg tablet Take 81 mg by mouth daily.     multivitamin (ONE A DAY) tablet Take 1 Tablet by mouth daily. No current facility-administered medications for this visit. Past Medical History:   Diagnosis Date    AMD (age related macular degeneration) 2022    Breast cancer ([facility] [address]     High cholesterol     Hypertension     Radiation therapy complication      Past Surgical History:   Procedure Laterality Date    HX MASTECTOMY Left     2012 (+)    HX TUMOR REMOVAL      Stomach      History reviewed. No pertinent family history. Social History     Tobacco Use   Smoking Status Former    Packs/day: 0.25    Years: 25.00    Pack years: 6.25    Types: Cigarettes    Quit date: 1991    Years since quittin.1   Smokeless Tobacco Never       Review of Systems   Constitutional: Negative. HENT:  Negative for congestion and sore throat. Eyes:  Negative for blurred vision. Respiratory:  Negative for cough, shortness of breath and wheezing. Cardiovascular: Negative. Gastrointestinal: Negative. Genitourinary: Negative. Musculoskeletal: Negative. Skin:  Positive for rash. Lt lower leg.hyperpigmented,eczematous skin change. since long,mild pain on deep palpation. Neurological:  Negative for dizziness, sensory change and headaches. Endo/Heme/Allergies:  Negative for polydipsia. Does not bruise/bleed easily. Psychiatric/Behavioral: Negative. Vitals:    23 0919 23 0942   BP: 121/79 122/78   Pulse: 82 80   Resp: 18    Temp: 98.2 °F (36.8 °C)    TempSrc: Oral    SpO2: 97%    Weight: 236 lb 6.4 oz (107.2 kg)    Height: 5' 4\" (1.626 m)           Physical Exam  Vitals and nursing note reviewed. Constitutional:       General: She is not in acute distress. Appearance: Normal appearance. She is obese. She is not ill-appearing or toxic-appearing. Cardiovascular:      Rate and Rhythm: Normal rate and regular rhythm. Pulses: Normal pulses. Heart sounds: Normal heart sounds. No murmur heard.   Pulmonary:      Effort: Pulmonary effort is normal.      Breath sounds: Normal breath sounds. No wheezing or rhonchi. Abdominal:      General: Bowel sounds are normal. There is no distension. Palpations: Abdomen is soft. Tenderness: There is no abdominal tenderness. Musculoskeletal:         General: No swelling. Right lower leg: No edema. Left lower leg: No edema. Comments: Minimal tenderness ,localized, around eczema. lt lower leg. Skin:     General: Skin is warm. Coloration: Skin is not jaundiced. Findings: Rash present. No bruising. Neurological:      General: No focal deficit present. Mental Status: She is alert and oriented to person, place, and time. Cranial Nerves: No cranial nerve deficit. Psychiatric:         Mood and Affect: Mood normal.         Behavior: Behavior normal.       On this date 02/24/2023 I have spent 35 minutes reviewing previous notes, test results and face to face with the patient discussing the diagnosis and importance of compliance with the treatment plan as well as documenting on the day of the visit. An electronic signature was used to authenticate this note.   -- Amanda Mix MD

## 2023-07-21 RX ORDER — SIMVASTATIN 40 MG
TABLET ORAL
Qty: 90 TABLET | Refills: 1 | Status: SHIPPED | OUTPATIENT
Start: 2023-07-21

## 2023-08-19 PROBLEM — I95.2 HYPOTENSION DUE TO DRUGS: Status: RESOLVED | Noted: 2022-10-21 | Resolved: 2023-08-19

## 2023-08-24 ENCOUNTER — OFFICE VISIT (OUTPATIENT)
Facility: CLINIC | Age: 83
End: 2023-08-24
Payer: COMMERCIAL

## 2023-08-24 VITALS
HEART RATE: 72 BPM | WEIGHT: 216.5 LBS | DIASTOLIC BLOOD PRESSURE: 80 MMHG | OXYGEN SATURATION: 98 % | SYSTOLIC BLOOD PRESSURE: 130 MMHG | TEMPERATURE: 98.1 F | HEIGHT: 64 IN | BODY MASS INDEX: 36.96 KG/M2

## 2023-08-24 DIAGNOSIS — Z95.0 CARDIAC PACEMAKER IN SITU: ICD-10-CM

## 2023-08-24 DIAGNOSIS — I25.2 HISTORY OF NON-ST ELEVATION MYOCARDIAL INFARCTION (NSTEMI): ICD-10-CM

## 2023-08-24 DIAGNOSIS — E78.00 PURE HYPERCHOLESTEROLEMIA: ICD-10-CM

## 2023-08-24 DIAGNOSIS — I10 PRIMARY HYPERTENSION: ICD-10-CM

## 2023-08-24 DIAGNOSIS — N95.9 MENOPAUSAL AND PERIMENOPAUSAL DISORDER: ICD-10-CM

## 2023-08-24 DIAGNOSIS — M15.9 PRIMARY OSTEOARTHRITIS INVOLVING MULTIPLE JOINTS: ICD-10-CM

## 2023-08-24 DIAGNOSIS — Z85.09 HISTORY OF GASTROINTESTINAL STROMAL TUMOR (GIST): ICD-10-CM

## 2023-08-24 DIAGNOSIS — G62.9 NEUROPATHY: ICD-10-CM

## 2023-08-24 DIAGNOSIS — Z85.3 HISTORY OF BREAST CANCER: ICD-10-CM

## 2023-08-24 PROBLEM — E66.01 OBESITY, CLASS III, BMI 40-49.9 (MORBID OBESITY) (HCC): Status: RESOLVED | Noted: 2021-10-16 | Resolved: 2023-08-24

## 2023-08-24 PROBLEM — E66.813 OBESITY, CLASS III, BMI 40-49.9 (MORBID OBESITY) (HCC): Status: RESOLVED | Noted: 2021-10-16 | Resolved: 2023-08-24

## 2023-08-24 PROCEDURE — 99214 OFFICE O/P EST MOD 30 MIN: CPT | Performed by: INTERNAL MEDICINE

## 2023-08-24 PROCEDURE — 3079F DIAST BP 80-89 MM HG: CPT | Performed by: INTERNAL MEDICINE

## 2023-08-24 PROCEDURE — 3074F SYST BP LT 130 MM HG: CPT | Performed by: INTERNAL MEDICINE

## 2023-08-24 PROCEDURE — 1123F ACP DISCUSS/DSCN MKR DOCD: CPT | Performed by: INTERNAL MEDICINE

## 2023-08-24 RX ORDER — VIT A/VIT C/VIT E/ZINC/COPPER 4296-226
1 CAPSULE ORAL DAILY
COMMUNITY

## 2023-08-24 ASSESSMENT — ENCOUNTER SYMPTOMS
EYES NEGATIVE: 1
GASTROINTESTINAL NEGATIVE: 1
ALLERGIC/IMMUNOLOGIC NEGATIVE: 1
RESPIRATORY NEGATIVE: 1

## 2023-08-24 NOTE — PROGRESS NOTES
Micki Adams (: 1940) is a 78 y.o. female, Established patient patient, here for evaluation of the following chief complaint(s):  Follow-up Chronic Condition         ASSESSMENT/PLAN:  Below is the assessment and plan developed based on review of pertinent history, physical exam, labs, studies, and medications. 1. Primary hypertension  -     CBC with Auto Differential; Future  -     Comprehensive Metabolic Panel; Future  2. Pure hypercholesterolemia  -     Lipid Panel; Future  3. BMI 37.0-37.9, adult  4. History of breast cancer  5. History of gastrointestinal stromal tumor (GIST)  6. History of non-ST elevation myocardial infarction (NSTEMI)  7. Primary osteoarthritis involving multiple joints  8. Neuropathy  9. Cardiac pacemaker in situ  10. Menopausal and perimenopausal disorder  -     DEXA BONE DENSITY AXIAL SKELETON; Future    Hypertension, well controlled, continued carvedilol 12.5 mg twice a day. She does not take any furosemide. Hypercholesteremia taking simvastatin 40 mg at bedtime. Repeat labs ordered diet low in starch sugar and saturated fat and she does not drink sugar-containing beverages she just drinks water. BMI was 41 in 2022 that has improved to 37 and her weight was 250 pounds in 2022 and now it is 216 pounds in 2023 so she has attended medical weight loss clinic at [facility] that I had referred and she is pleased and she did the  meeting and in person also now she is doing by her own eating only 3 meals a day and has stopped eating and cut back eating carb and starch in the diet and sugar in the diet. She is a breast cancer survivor and up to date for mammogram.    She is having pacemaker. Continue low-dose aspirin and statin having history of non-ST elevation MI in the past.    Now she has no symptoms of neuropathy. Discussed about all vaccinations including taking Tdap, flu, COVID-vaccine and Shingrix vaccine.   Discussed about time interval

## 2023-08-31 ENCOUNTER — HOSPITAL ENCOUNTER (OUTPATIENT)
Facility: HOSPITAL | Age: 83
Discharge: HOME | End: 2023-08-31
Attending: INTERNAL MEDICINE
Payer: MEDICARE

## 2023-08-31 DIAGNOSIS — N95.9 MENOPAUSAL AND PERIMENOPAUSAL DISORDER: ICD-10-CM

## 2023-08-31 PROCEDURE — 77080 DXA BONE DENSITY AXIAL: CPT

## 2023-12-01 ENCOUNTER — APPOINTMENT (OUTPATIENT)
Facility: HOSPITAL | Age: 83
End: 2023-12-01
Payer: MEDICARE

## 2023-12-01 ENCOUNTER — HOSPITAL ENCOUNTER (EMERGENCY)
Facility: HOSPITAL | Age: 83
Discharge: HOME | End: 2023-12-01
Attending: STUDENT IN AN ORGANIZED HEALTH CARE EDUCATION/TRAINING PROGRAM
Payer: MEDICARE

## 2023-12-01 VITALS
HEIGHT: 64 IN | SYSTOLIC BLOOD PRESSURE: 156 MMHG | OXYGEN SATURATION: 97 % | BODY MASS INDEX: 37.22 KG/M2 | RESPIRATION RATE: 15 BRPM | DIASTOLIC BLOOD PRESSURE: 97 MMHG | HEART RATE: 70 BPM | WEIGHT: 218 LBS | TEMPERATURE: 98.5 F

## 2023-12-01 DIAGNOSIS — G89.29 CHRONIC PAIN OF RIGHT HIP: Primary | ICD-10-CM

## 2023-12-01 DIAGNOSIS — M25.551 CHRONIC PAIN OF RIGHT HIP: Primary | ICD-10-CM

## 2023-12-01 PROCEDURE — 96372 THER/PROPH/DIAG INJ SC/IM: CPT

## 2023-12-01 PROCEDURE — 6360000002 HC RX W HCPCS: Performed by: STUDENT IN AN ORGANIZED HEALTH CARE EDUCATION/TRAINING PROGRAM

## 2023-12-01 PROCEDURE — 6370000000 HC RX 637 (ALT 250 FOR IP): Performed by: STUDENT IN AN ORGANIZED HEALTH CARE EDUCATION/TRAINING PROGRAM

## 2023-12-01 PROCEDURE — 73502 X-RAY EXAM HIP UNI 2-3 VIEWS: CPT

## 2023-12-01 PROCEDURE — 99284 EMERGENCY DEPT VISIT MOD MDM: CPT

## 2023-12-01 RX ORDER — SENNOSIDES 8.6 MG
650 CAPSULE ORAL EVERY 8 HOURS PRN
Qty: 30 TABLET | Refills: 0 | Status: SHIPPED | OUTPATIENT
Start: 2023-12-01

## 2023-12-01 RX ORDER — ACETAMINOPHEN 500 MG
1000 TABLET ORAL
Status: COMPLETED | OUTPATIENT
Start: 2023-12-01 | End: 2023-12-01

## 2023-12-01 RX ORDER — KETOROLAC TROMETHAMINE 30 MG/ML
30 INJECTION, SOLUTION INTRAMUSCULAR; INTRAVENOUS
Status: COMPLETED | OUTPATIENT
Start: 2023-12-01 | End: 2023-12-01

## 2023-12-01 RX ADMIN — ACETAMINOPHEN 1000 MG: 500 TABLET ORAL at 10:10

## 2023-12-01 RX ADMIN — KETOROLAC TROMETHAMINE 30 MG: 30 INJECTION, SOLUTION INTRAMUSCULAR at 10:10

## 2023-12-01 ASSESSMENT — PAIN SCALES - GENERAL: PAINLEVEL_OUTOF10: 5

## 2023-12-01 ASSESSMENT — PAIN - FUNCTIONAL ASSESSMENT: PAIN_FUNCTIONAL_ASSESSMENT: 0-10

## 2023-12-01 ASSESSMENT — PAIN DESCRIPTION - LOCATION: LOCATION: HIP;BACK

## 2023-12-01 NOTE — ED PROVIDER NOTES
[facility] EMERGENCY DEPT  EMERGENCY DEPARTMENT HISTORY AND PHYSICAL EXAM      Date: 2023  Patient Name: Daryl Richards  MRN: [id number]  [address] [facility] 1940  Date of evaluation: 2023  Provider: Chaunecy Allison MD   Note Started: 10:01 AM EST 23    HISTORY OF PRESENT ILLNESS     Chief Complaint   Patient presents with    bursitis        History Provided By: Patient    HPI: Daryl Richards is a 78 y.o. female with PMH of HLD, HTN, and recurrent hip pain who comes to the ED with right hip pain. She reports the hip pain started on Monday. This is similar to pain that she had last month. She reports that this does feel like bursitis. Pain is worsened with movement without specific review relieving factors aside from that without associated swelling. No fever, chills or sweats. She took Advil at home and that did not relieve her pain and thus came to the ED. Patient is denying any pain anywhere else. Denies any change in bowel, dietary or urinary habits. Patient does not have any other symptoms or complaints today. PAST MEDICAL HISTORY   Past Medical History:  Past Medical History:   Diagnosis Date    AMD (age related macular degeneration) 2022    Breast cancer ([address]) 2012    High cholesterol     Hypertension     Radiation therapy complication        Past Surgical History:  Past Surgical History:   Procedure Laterality Date    MASTECTOMY Left      (+)    TUMOR REMOVAL      Stomach        Family History:  No family history on file. Social History:  Social History     Tobacco Use    Smoking status: Former     Packs/day: .25     Types: Cigarettes     Quit date: 1991     Years since quittin.9    Smokeless tobacco: Never   Substance Use Topics    Alcohol use: Not Currently    Drug use: Never       Allergies:  No Known Allergies    PCP: Rocky Culver MD    Current Meds:   No current facility-administered medications for this encounter.      Current Outpatient Medications Class III - visualization of the soft palate and the base of the uvula

## 2023-12-01 NOTE — ED TRIAGE NOTES
Pt  has been seen for bursitis, and has tried all of the thing the dr has suggested nothing is getting better.

## 2023-12-18 ENCOUNTER — TELEPHONE (OUTPATIENT)
Facility: CLINIC | Age: 83
End: 2023-12-18

## 2023-12-18 NOTE — TELEPHONE ENCOUNTER
[facility] is here [facility] stating she has had several appointments where all doctors agree that would be the next step. Please advise

## 2024-01-04 ENCOUNTER — HOSPITAL ENCOUNTER (OUTPATIENT)
Facility: HOSPITAL | Age: 84
Setting detail: RECURRING SERIES
Discharge: HOME | End: 2024-01-07
Payer: MEDICARE

## 2024-01-04 PROCEDURE — 97162 PT EVAL MOD COMPLEX 30 MIN: CPT

## 2024-01-04 PROCEDURE — 97110 THERAPEUTIC EXERCISES: CPT

## 2024-01-04 NOTE — THERAPY EVALUATION
flexibility/joint mobility, and decrease transfer abilities    Treatment Plan may include any combination of the followin Therapeutic Exercise, 04251 Neuromuscular Re-Education, 41491 Manual Therapy, 13670 Electrical Stim unattended, 72550 Gait Training, 03798 Ultrasound, and (Elective Self Pay) Needle Insertion w/o Injection (1 or 2 muscles), (3+ muscles)  Patient / Family readiness to learn indicated by: asking questions, trying to perform skills, interest, return verbalization , and return demonstration   Persons(s) to be included in education: patient (P)  Barriers to Learning/Limitations: none  Measures taken if barriers to learning present: N/A  Patient Self Reported Health Status: good  Rehabilitation Potential: good    Short Term Goals: To be accomplished in 6-8  treatments.    Pt will be independent with HEP to promote progression of therapy services and decrease soreness following sessions.      [] Met [] Not met [] Partially met  Date:     Pt will verbalize and perform safe use of ice/heat/massage to assist with inflammation and pain management as instructed with no pain >6/10 on VAS.      [] Met [] Not met [] Partially met  Date:    Pt will verbalize and use proper sleeping techniques for pain/contracture prevention.       [] Met [] Not met [] Partially me  Date:      Long Term Goals: To be accomplished in 16-20  treatments.  Pt will have improved AMPAC score by 5%.        [] Met [] Not met [] Partially met Date:     Pt can sleep comfortably on involved side for at least 2 hours without waking in pain.      [] Met [] Not met [] Partially met Date:     Pt can get up after sitting for >/= 30 minutes without difficulty/use of hands, stiffness, or stumbling.      [] Met [] Not met [] Partially met Date:     Pt can ambulate community distances without hip pain or fear of falling, to get groceries, medications. Pt performs 6 MWT for 500 feet safely without verbal cues.      [] Met [] Not met []

## 2024-01-08 ENCOUNTER — HOSPITAL ENCOUNTER (OUTPATIENT)
Facility: HOSPITAL | Age: 84
Setting detail: RECURRING SERIES
Discharge: HOME | End: 2024-01-11
Payer: MEDICARE

## 2024-01-08 PROCEDURE — 97110 THERAPEUTIC EXERCISES: CPT | Performed by: PHYSICAL THERAPIST

## 2024-01-08 NOTE — PROGRESS NOTES
PHYSICAL THERAPY - MEDICARE DAILY TREATMENT NOTE (updated 3/23)      Date: 2024          Patient Name:  Micki Webber :  1940   Medical   Diagnosis:  General weakness [R53.1] Treatment Diagnosis:  M62.81  GENERAL MUSCLE WEAKNESS    Referral Source:  Jacquelyn Murcia MD Insurance:   Payor: MEDICARE / Plan: MEDICARE PART A AND B / Product Type: *No Product type* /                     Patient  verified yes     Visit #   Current  / Total 2 16-20   Time   In / Out 1:03 pm 1:56 pm   Total Treatment Time 50   Total Timed Codes 50   1:1 Treatment Time 50      [facility] Totals Reminder:  bill using total billable   min of TIMED therapeutic procedures and modalities.   8-22 min = 1 unit; 23-37 min = 2 units; 38-52 min = 3 units; 53-67 min = 4 units; 68-82 min = 5 units            SUBJECTIVE    Pain Level (0-10 scale): 0/10    Any medication changes, allergies to medications, adverse drug reactions, diagnosis change, or new procedure performed?: [x] No    [] Yes (see summary sheet for update)  Medications: Verified on Patient Summary List    Subjective functional status/changes:      Patient returns reporting she has been using the RW to get around since eval and feels like she walks better with it.  Reports she did some of the exercises and has not had too much pain in her hip.      OBJECTIVE      Therapeutic Procedures:  Tx Min Billable or 1:1 Min (if diff from Tx Min) Procedure, Rationale, Specifics   50  81166 Therapeutic Exercise (timed):  increase ROM, strength, coordination, balance, and proprioception to improve patient's ability to progress to PLOF and address remaining functional goals. (see flow sheet as applicable)     Details if applicable:          50      Total Total       Deferred CP today due to no pain.     [x]  Patient Education billed concurrently with other procedures continue HEP in ROM that does not increase pain  [x] Review HEP    [] Progressed/Changed HEP, detail:    [] Other detail:   
all other ROS negative except as per HPI

## 2024-01-10 ENCOUNTER — TRANSCRIBE ORDERS (OUTPATIENT)
Facility: HOSPITAL | Age: 84
End: 2024-01-10

## 2024-01-10 DIAGNOSIS — Z12.31 VISIT FOR SCREENING MAMMOGRAM: Primary | ICD-10-CM

## 2024-01-11 ENCOUNTER — HOSPITAL ENCOUNTER (OUTPATIENT)
Facility: HOSPITAL | Age: 84
Setting detail: RECURRING SERIES
Discharge: HOME | End: 2024-01-14
Payer: MEDICARE

## 2024-01-11 PROCEDURE — 97110 THERAPEUTIC EXERCISES: CPT

## 2024-01-11 NOTE — PROGRESS NOTES
PHYSICAL THERAPY - MEDICARE DAILY TREATMENT NOTE (updated 3/23)      Date: 2024          Patient Name:  Micki Webber :  1940   Medical   Diagnosis:  General weakness [R53.1] Treatment Diagnosis:  M62.81  GENERAL MUSCLE WEAKNESS    Referral Source:  Jacquelyn Murcia MD Insurance:   Payor: MEDICARE / Plan: MEDICARE PART A AND B / Product Type: *No Product type* /                     Patient  verified yes     Visit #   Current  / Total 3 16-20   Time   In / Out 0905am  0952am   Total Treatment Time 45   Total Timed Codes 45   1:1 Treatment Time 45      [facility] Totals Reminder:  bill using total billable   min of TIMED therapeutic procedures and modalities.   8-22 min = 1 unit; 23-37 min = 2 units; 38-52 min = 3 units; 53-67 min = 4 units; 68-82 min = 5 units            SUBJECTIVE    Pain Level (0-10 scale): 0/10    Any medication changes, allergies to medications, adverse drug reactions, diagnosis change, or new procedure performed?: [x] No    [] Yes (see summary sheet for update)  Medications: Verified on Patient Summary List    Subjective functional status/changes:    Stated stated she got a shot on the R hip for her bursitis and she feels much better. She has been doing her HEP.        OBJECTIVE      Therapeutic Procedures:  Tx Min Billable or 1:1 Min (if diff from Tx Min) Procedure, Rationale, Specifics   45  33250 Therapeutic Exercise (timed):  increase ROM, strength, coordination, balance, and proprioception to improve patient's ability to progress to PLOF and address remaining functional goals. (see flow sheet as applicable)     Details if applicable:          45      Total Total       Deferred CP today due to no pain.     [x]  Patient Education billed concurrently with other procedures continue HEP in ROM that does not increase pain  [x] Review HEP    [] Progressed/Changed HEP, detail:    [] Other detail:         Other Objective/Functional Measures  Pt requested to be timed with exercises to

## 2024-01-15 ENCOUNTER — HOSPITAL ENCOUNTER (OUTPATIENT)
Facility: HOSPITAL | Age: 84
Setting detail: RECURRING SERIES
Discharge: HOME | End: 2024-01-18
Payer: MEDICARE

## 2024-01-15 PROCEDURE — 97110 THERAPEUTIC EXERCISES: CPT

## 2024-01-15 NOTE — PROGRESS NOTES
PHYSICAL THERAPY - MEDICARE DAILY TREATMENT NOTE (updated 3/23)      Date: 1/15/2024          Patient Name:  Micki Webber :  1940   Medical   Diagnosis:  General weakness [R53.1] Treatment Diagnosis:  M62.81  GENERAL MUSCLE WEAKNESS    Referral Source:  Jacquelyn Murcia MD Insurance:   Payor: MEDICARE / Plan: MEDICARE PART A AND B / Product Type: *No Product type* /                     Patient  verified yes     Visit #   Current  / Total 4 16-20   Time   In / Out 08:40 am  09:30 am   Total Treatment Time 45 min   Total Timed Codes 45 min   1:1 Treatment Time 45 min      [facility] Totals Reminder:  bill using total billable   min of TIMED therapeutic procedures and modalities.   8-22 min = 1 unit; 23-37 min = 2 units; 38-52 min = 3 units; 53-67 min = 4 units; 68-82 min = 5 units            SUBJECTIVE    Pain Level (0-10 scale): 0/10    Any medication changes, allergies to medications, adverse drug reactions, diagnosis change, or new procedure performed?: [x] No    [] Yes (see summary sheet for update)  Medications: Verified on Patient Summary List    Subjective functional status/changes:    Patient stated she just wants to get stronger. Patient will bring in phone , so she can get shown how to use her timer on her phone. Patient stated she mainly sits and watches TV but will try to get up more and do exercises during commercials.         OBJECTIVE      Therapeutic Procedures:  Tx Min Billable or 1:1 Min (if diff from Tx Min) Procedure, Rationale, Specifics   45  25752 Therapeutic Exercise (timed):  increase ROM, strength, coordination, balance, and proprioception to improve patient's ability to progress to PLOF and address remaining functional goals. (see flow sheet as applicable)     Details if applicable:          45      Total Total       Deferred CP today due to no pain.     [x]  Patient Education billed concurrently with other procedures continue HEP in ROM that does not increase pain  [x] Review

## 2024-01-18 ENCOUNTER — HOSPITAL ENCOUNTER (OUTPATIENT)
Facility: HOSPITAL | Age: 84
Setting detail: RECURRING SERIES
Discharge: HOME | End: 2024-01-21
Payer: MEDICARE

## 2024-01-18 PROCEDURE — 97110 THERAPEUTIC EXERCISES: CPT

## 2024-01-18 NOTE — PROGRESS NOTES
PHYSICAL THERAPY - MEDICARE DAILY TREATMENT NOTE (updated 3/23)      Date: 2024          Patient Name:  Micki Webber :  1940   Medical   Diagnosis:  General weakness [R53.1] Treatment Diagnosis:  M62.81  GENERAL MUSCLE WEAKNESS    Referral Source:  Jacquelyn Murcia MD Insurance:   Payor: MEDICARE / Plan: MEDICARE PART A AND B / Product Type: *No Product type* /                     Patient  verified yes     Visit #   Current  / Total 4 16-20   Time   In / Out 1040 am  11:30 am   Total Treatment Time 45 min   Total Timed Codes 45 min   1:1 Treatment Time 45 min      [facility] Totals Reminder:  bill using total billable   min of TIMED therapeutic procedures and modalities.   8-22 min = 1 unit; 23-37 min = 2 units; 38-52 min = 3 units; 53-67 min = 4 units; 68-82 min = 5 units            SUBJECTIVE    Pain Level (0-10 scale): 0/10    Any medication changes, allergies to medications, adverse drug reactions, diagnosis change, or new procedure performed?: [x] No    [] Yes (see summary sheet for update)  Medications: Verified on Patient Summary List    Subjective functional status/changes:    Patient stated she feels good today, \" The shot really helped me\" Pt stated she brought her phone to learn how to use her timer.       OBJECTIVE      Therapeutic Procedures:  Tx Min Billable or 1:1 Min (if diff from Tx Min) Procedure, Rationale, Specifics   45  05228 Therapeutic Exercise (timed):  increase ROM, strength, coordination, balance, and proprioception to improve patient's ability to progress to PLOF and address remaining functional goals. (see flow sheet as applicable)     Details if applicable:          45      Total Total       Deferred CP today due to no pain.     [x]  Patient Education billed concurrently with other procedures continue HEP in ROM that does not increase pain  [x] Review HEP    [] Progressed/Changed HEP, detail:    [] Other detail:         Other Objective/Functional Measures  Pt brought

## 2024-01-22 ENCOUNTER — HOSPITAL ENCOUNTER (OUTPATIENT)
Facility: HOSPITAL | Age: 84
Setting detail: RECURRING SERIES
Discharge: HOME | End: 2024-01-25
Payer: MEDICARE

## 2024-01-22 PROCEDURE — 97110 THERAPEUTIC EXERCISES: CPT

## 2024-01-22 NOTE — PROGRESS NOTES
progression of therapy services and decrease soreness following sessions.      [] Met [] Not met [] Partially met  Date:      Pt will verbalize and perform safe use of ice/heat/massage to assist with inflammation and pain management as instructed with no pain >6/10 on VAS.      [] Met [] Not met [] Partially met  Date:     Pt will verbalize and use proper sleeping techniques for pain/contracture prevention.       [] Met [] Not met [] Partially me  Date:        Long Term Goals: To be accomplished in 16-20  treatments.  Pt will have improved AMPAC score by 5%.        [] Met [] Not met [] Partially met Date:      Pt can sleep comfortably on involved side for at least 2 hours without waking in pain.      [] Met [] Not met [] Partially met Date:      Pt can get up after sitting for >/= 30 minutes without difficulty/use of hands, stiffness, or stumbling.      [] Met [] Not met [] Partially met Date:      Pt can ambulate community distances without hip pain or fear of falling, to get groceries, medications. Pt performs 6 MWT for 500 feet safely without verbal cues.      [] Met [] Not met [] Partially met Date:      Pt can go up and down steps with 1 rail support without difficulty, fatigue, or pain > 3/10 for safe gait.      [] Met [] Not met [] Partially met Date:      Pt can negotiate uneven terrain independently, like walking through the yard, without hip pain or instability.      [] Met [] Not met [] Partially met  Date:      Pt can SLS on involved side for 10-15 seconds with 1 rail support for gait stance phase tolerance and safety.       [] Met [] Not met [] Partially met Date:      Pt will be able to squat to retrieve item from ground without increase of pain.      [] Met [] Not met [] Partially met Date:      PLAN  Yes  Continue plan of care  Re-Cert Due: 3/29/2024  [x]  Upgrade activities as tolerated  []  Discharge due to :  []  Other:      Monique Castillo, PT       1/22/2024       3:20 PM

## 2024-01-23 ENCOUNTER — HOSPITAL ENCOUNTER (OUTPATIENT)
Facility: HOSPITAL | Age: 84
Discharge: HOME | End: 2024-01-26
Attending: INTERNAL MEDICINE
Payer: MEDICARE

## 2024-01-23 DIAGNOSIS — Z12.31 VISIT FOR SCREENING MAMMOGRAM: ICD-10-CM

## 2024-01-23 PROCEDURE — 77063 BREAST TOMOSYNTHESIS BI: CPT

## 2024-01-26 ENCOUNTER — HOSPITAL ENCOUNTER (OUTPATIENT)
Facility: HOSPITAL | Age: 84
Setting detail: RECURRING SERIES
Discharge: HOME | End: 2024-01-29
Payer: MEDICARE

## 2024-01-26 PROCEDURE — 97110 THERAPEUTIC EXERCISES: CPT

## 2024-01-26 NOTE — PROGRESS NOTES
PHYSICAL THERAPY - MEDICARE DAILY TREATMENT NOTE (updated 3/23)      Date: 2024          Patient Name:  Micki Webber :  1940   Medical   Diagnosis:  General weakness [R53.1] Treatment Diagnosis:  M62.81  GENERAL MUSCLE WEAKNESS    Referral Source:  Jacquelyn Murcia MD Insurance:   Payor: MEDICARE / Plan: MEDICARE PART A AND B / Product Type: *No Product type* /                     Patient  verified yes     Visit #   Current  / Total 7 16-20   Time   In / Out 11:30 am  12:15 pm   Total Treatment Time 45 min   Total Timed Codes 45 min   1:1 Treatment Time 45 min      [facility] Totals Reminder:  bill using total billable   min of TIMED therapeutic procedures and modalities.   8-22 min = 1 unit; 23-37 min = 2 units; 38-52 min = 3 units; 53-67 min = 4 units; 68-82 min = 5 units            SUBJECTIVE    Pain Level (0-10 scale): 0/10    Any medication changes, allergies to medications, adverse drug reactions, diagnosis change, or new procedure performed?: [x] No    [] Yes (see summary sheet for update)  Medications: Verified on Patient Summary List    Subjective functional status/changes:    Patient stated she was just tired this morning. \"She really worked me out the last time.\"    OBJECTIVE      Therapeutic Procedures:  Tx Min Billable or 1:1 Min (if diff from Tx Min) Procedure, Rationale, Specifics   45  14272 Therapeutic Exercise (timed):  increase ROM, strength, coordination, balance, and proprioception to improve patient's ability to progress to PLOF and address remaining functional goals. (see flow sheet as applicable)     Details if applicable:          45      Total Total         [x]  Patient Education billed concurrently with other procedures continue HEP in ROM that does not increase pain  [x] Review HEP    [] Progressed/Changed HEP, detail:    [] Other detail:       Modality rationale:  [] Decrease edema  [] Decrease inflammation   [] Decrease pain      [] Increase tissue extensibility   []

## 2024-02-05 ENCOUNTER — HOSPITAL ENCOUNTER (OUTPATIENT)
Facility: HOSPITAL | Age: 84
Setting detail: RECURRING SERIES
Discharge: HOME | End: 2024-02-08
Payer: MEDICARE

## 2024-02-05 PROCEDURE — 97110 THERAPEUTIC EXERCISES: CPT

## 2024-02-05 NOTE — PROGRESS NOTES
PHYSICAL THERAPY - MEDICARE DAILY TREATMENT NOTE (updated 3/23)      Date: 2024          Patient Name:  Micki Webber :  1940   Medical   Diagnosis:  General weakness [R53.1] Treatment Diagnosis:  M62.81  GENERAL MUSCLE WEAKNESS    Referral Source:  Jacquelyn Murcia MD Insurance:   Payor: MEDICARE / Plan: MEDICARE PART A AND B / Product Type: *No Product type* /                     Patient  verified yes     Visit #   Current  / Total 7 16-20   Time   In / Out 11:30 am  12:10 pm   Total Treatment Time 30 min   Total Timed Codes 15 min   1:1 Treatment Time 15 min      [facility] Totals Reminder:  bill using total billable   min of TIMED therapeutic procedures and modalities.   8-22 min = 1 unit; 23-37 min = 2 units; 38-52 min = 3 units; 53-67 min = 4 units; 68-82 min = 5 units            SUBJECTIVE    Pain Level (0-10 scale): -8/10    Any medication changes, allergies to medications, adverse drug reactions, diagnosis change, or new procedure performed?: [x] No    [] Yes (see summary sheet for update)  Medications: Verified on Patient Summary List    Subjective functional status/changes:    Patient stated she doesn't know why her hip started hurting again. \"I've was doing so well until this weekend. \" Patient states she has been using the Rolling walker the whole month of January. She reports she thinks she sat too much last week. \"I did not do any exercises or get up and move around. I did not know I was supposed to.\" Patient came in reporting she did not feel good today but she wanted to try to see if she could do the exercises. After trying to ambulate in gym patient requested to end session due to pain and wanting to go home.    OBJECTIVE      Therapeutic Procedures:  Tx Min Billable or 1:1 Min (if diff from Tx Min) Procedure, Rationale, Specifics   15  92311 Therapeutic Exercise (timed):  increase ROM, strength, coordination, balance, and proprioception to improve patient's ability to progress to

## 2024-02-08 ENCOUNTER — HOSPITAL ENCOUNTER (OUTPATIENT)
Facility: HOSPITAL | Age: 84
Setting detail: RECURRING SERIES
Discharge: HOME | End: 2024-02-11
Payer: MEDICARE

## 2024-02-08 PROCEDURE — 97110 THERAPEUTIC EXERCISES: CPT | Performed by: PHYSICAL THERAPIST

## 2024-02-08 NOTE — PROGRESS NOTES
stiffness, or stumbling.      [x] Met [] Not met [] Partially met Date: reporting much improved ease of sit to stand transfers since SOC      Pt can ambulate community distances without hip pain or fear of falling, to get groceries, medications. Pt performs 6 MWT for 500 feet safely without verbal cues.      [] Met [x] Not met [] Partially met Date:24 patient was only able to complete 3 min     Pt can go up and down steps with 1 rail support without difficulty, fatigue, or pain > 3/10 for safe gait.      [x] Met [] Not met [] Partially met Date: much better     Pt can negotiate uneven terrain independently, like walking through the yard, without hip pain or instability.      [] Met [x] Not met [] Partially met  Date:  has not tried uneven surfaces     Pt can SLS on involved side for 10-15 seconds with 1 rail support for gait stance phase tolerance and safety.       [] Met [x] Not met [] Partially met Date:  8 seconds R, 15 seconds L with 1 UE support     Pt will be able to squat to retrieve item from ground without increase of pain.      [] Met [] Not met [x] Partially met Date:      PLAN  Treatment Plan may include any combination of the followin Therapeutic Exercise, 02469 Neuromuscular Re-Education, 77553 Manual Therapy, 80050 Electrical Stim unattended, 08002 Gait Training, 97349 Ultrasound, and (Elective Self Pay) Needle Insertion w/o Injection (1 or 2 muscles), (3+ muscles)     Yes  Continue plan of care  Re-Cert Due: 3/29/2024  [x]  Upgrade activities as tolerated  []  Discharge due to :  []  Other:      ELISABETH REYNA, PT       2024       10:38 AM             
No device   19.0 +/- 2.5 With device   Mitchel Roldan. The Time \"up and go\": A test of Basic Functional Mobility for Frail Elderly Persons. Journal of American Geriatrics Society 1991; 29 (1) 215797  Ricardopeter Meier A, Dora S, Lázaro TAVERAS.  Predicting the Probability for Falls in Community Dwelling Older Adults Using the Timed Up and Go Test, Physical Therapy 2000, 80 (9): 712411.  Arash MM, Ryne GL, Justine TAVERAS. Functional Performance in Community Living Older Adults, Journal of Geriatric Physical Therapy 2003; 26(3): 14-22.  [facility], Falls Screening and Referall Algorithm, TUG, [facility], June, 2005        Stairs: NT      Modified Clinical Test of Sensory Interaction (CTSIB-M):               Eyes open/firm surface: > 5 min      Tandem Stand:              Initial R foot forward: EO: unable (even with hand support )                       L foot forward:  EO unable (even with hand support)                      2/8 R foot forward EO: 2 seconds without hand support          L foot forward: EO 4 seconds without hand support                 Single limb stand:       Initial       R: unable                     L: unable         2/8     R: < 1 seconds without UE, 8 seconds with 1 UE A   L: 2 seconds without UE, 15 seconds with 1 UE      Six Minute Walk Test: Distance in feet: 254 feet in 4 minutes with 2 standing breaks then required seated break        Asst Device: RW  Additional comments: stopped due to LE fatigue, no c/o pain  Description: Measure of endurance using the distance a patient can walk in 6 minutes.    Interpretation:  Age-Matched Norms      Distance in Feet   Age in Years  MEN  WOMEN   60-64  9417-2588  6986-7426   65-69  0644-7062  1690-5280   70-74  8361-7859  8952-2927   75-79  1930-1661  [phone]   80-84  2999-2407  8651-5930   85-89  0945-8185  5176-1163   90-94  915-1500  [phone]   1. American Thoracic Society (2002).  ATS

## 2024-02-12 ENCOUNTER — HOSPITAL ENCOUNTER (OUTPATIENT)
Facility: HOSPITAL | Age: 84
Setting detail: RECURRING SERIES
Discharge: HOME | End: 2024-02-15
Payer: MEDICARE

## 2024-02-12 PROCEDURE — 97110 THERAPEUTIC EXERCISES: CPT

## 2024-02-12 NOTE — PROGRESS NOTES
PHYSICAL THERAPY - MEDICARE DAILY TREATMENT NOTE (updated 3/23)      Date: 2024          Patient Name:  Micki Webber :  1940   Medical   Diagnosis:  General weakness [R53.1] Treatment Diagnosis:  M62.81  GENERAL MUSCLE WEAKNESS    Referral Source:  Jacquelyn Murcia MD Insurance:   Payor: MEDICARE / Plan: MEDICARE PART A AND B / Product Type: *No Product type* /                     Patient  verified yes     Visit #   Current  / Total 9 16-20   Time   In / Out 1136 am  12:32pm   Total Treatment Time 50 min   Total Timed Codes 45 min   1:1 Treatment Time 45 min      [facility] Totals Reminder:  bill using total billable   min of TIMED therapeutic procedures and modalities.   8-22 min = 1 unit; 23-37 min = 2 units; 38-52 min = 3 units; 53-67 min = 4 units; 68-82 min = 5 units            SUBJECTIVE    Pain Level (0-10 scale): 0/10  R hip    Any medication changes, allergies to medications, adverse drug reactions, diagnosis change, or new procedure performed?: [x] No    [] Yes (see summary sheet for update)  Medications: Verified on Patient Summary List    Subjective functional status/changes:    Pt stated she is doing well, has not pain she is just fatigue. Pt stated she wants to get her endurance better.      OBJECTIVE      Therapeutic Procedures:  Tx Min Billable or 1:1 Min (if diff from Tx Min) Procedure, Rationale, Specifics   45  33997 Therapeutic Exercise (timed):  increase ROM, strength, coordination, balance, and proprioception to improve patient's ability to progress to PLOF and address remaining functional goals. (see flow sheet as applicable)     Details if applicable:  see flow sheet        45      Total Total             Modality rationale:  [] Decrease edema  [] Decrease inflammation   [] Decrease pain      [] Increase tissue extensibility   [] Increase muscle contraction/control   [] Other:     to improve the patient’s ability to reach functional goals.   Min Type Additional Details    []

## 2024-02-15 ENCOUNTER — HOSPITAL ENCOUNTER (OUTPATIENT)
Facility: HOSPITAL | Age: 84
Setting detail: RECURRING SERIES
Discharge: HOME | End: 2024-02-18
Payer: MEDICARE

## 2024-02-15 PROCEDURE — 97110 THERAPEUTIC EXERCISES: CPT | Performed by: PHYSICAL THERAPIST

## 2024-02-15 NOTE — PROGRESS NOTES
PHYSICAL THERAPY - MEDICARE DAILY TREATMENT NOTE (updated 3/23)      Date: 2/15/2024          Patient Name:  Micki Webber :  1940   Medical   Diagnosis:  General weakness [R53.1] Treatment Diagnosis:  M62.81  GENERAL MUSCLE WEAKNESS    Referral Source:  Jacquelyn Murcia MD Insurance:   Payor: MEDICARE / Plan: MEDICARE PART A AND B / Product Type: *No Product type* /                     Patient  verified yes     Visit #   Current  / Total 11 16-20   Time   In / Out 11:28 am  12:22 pm   Total Treatment Time 45 min   Total Timed Codes 45 min   1:1 Treatment Time 45 min      [facility] Totals Reminder:  bill using total billable   min of TIMED therapeutic procedures and modalities.   8-22 min = 1 unit; 23-37 min = 2 units; 38-52 min = 3 units; 53-67 min = 4 units; 68-82 min = 5 units            SUBJECTIVE    Pain Level (0-10 scale): 2/10  R hip    Any medication changes, allergies to medications, adverse drug reactions, diagnosis change, or new procedure performed?: [x] No    [] Yes (see summary sheet for update)  Medications: Verified on Patient Summary List    Subjective functional status/changes:    Pt stated she has mild hip pain today.  She reports no other changes.       OBJECTIVE      Therapeutic Procedures:  Tx Min Billable or 1:1 Min (if diff from Tx Min) Procedure, Rationale, Specifics   45  01401 Therapeutic Exercise (timed):  increase ROM, strength, coordination, balance, and proprioception to improve patient's ability to progress to PLOF and address remaining functional goals. (see flow sheet as applicable)     Details if applicable:  see flow sheet        45      Total Total           [x]  Patient Education billed concurrently with other procedures recommended patient try to move PCP appointment earlier to discuss R hip pain, memory, and LE swelling    [x] Review HEP    [] Progressed/Changed HEP, detail:    [] Other detail:       Other Objective/Functional Measures  We progressed with exercise

## 2024-02-19 ENCOUNTER — TELEPHONE (OUTPATIENT)
Facility: CLINIC | Age: 84
End: 2024-02-19

## 2024-02-19 ENCOUNTER — HOSPITAL ENCOUNTER (OUTPATIENT)
Facility: HOSPITAL | Age: 84
Setting detail: RECURRING SERIES
Discharge: HOME | End: 2024-02-22
Payer: MEDICARE

## 2024-02-19 DIAGNOSIS — I99.9 CIRCULATION PROBLEM: Primary | ICD-10-CM

## 2024-02-19 PROCEDURE — 97110 THERAPEUTIC EXERCISES: CPT

## 2024-02-19 NOTE — PROGRESS NOTES
PHYSICAL THERAPY - MEDICARE DAILY TREATMENT NOTE (updated 3/23)      Date: 2024          Patient Name:  Micki Webber :  1940   Medical   Diagnosis:  General weakness [R53.1] Treatment Diagnosis:  M62.81  GENERAL MUSCLE WEAKNESS    Referral Source:  Jacquelyn Murcia MD Insurance:   Payor: MEDICARE / Plan: MEDICARE PART A AND B / Product Type: *No Product type* /                     Patient  verified yes     Visit #   Current  / Total 12 16-20   Time   In / Out 11:30 am  12:36pm   Total Treatment Time 60 min   Total Timed Codes 55 min   1:1 Treatment Time 55min      [facility] Totals Reminder:  bill using total billable   min of TIMED therapeutic procedures and modalities.   8-22 min = 1 unit; 23-37 min = 2 units; 38-52 min = 3 units; 53-67 min = 4 units; 68-82 min = 5 units            SUBJECTIVE    Pain Level (0-10 scale): 2/10  R hip    Any medication changes, allergies to medications, adverse drug reactions, diagnosis change, or new procedure performed?: [x] No    [] Yes (see summary sheet for update)  Medications: Verified on Patient Summary List    Subjective functional status/changes:    Pt still c/o R hip pain (points to around R ITB area)       OBJECTIVE      Therapeutic Procedures:  Tx Min Billable or 1:1 Min (if diff from Tx Min) Procedure, Rationale, Specifics   45  37073 Therapeutic Exercise (timed):  increase ROM, strength, coordination, balance, and proprioception to improve patient's ability to progress to PLOF and address remaining functional goals. (see flow sheet as applicable)     Details if applicable:  see flow sheet   10  60429 Manual Therapy (timed):  decrease pain, increase ROM, increase tissue extensibility, correct positional vertigo, and decrease trigger points to improve patient's ability to progress to PLOF and address remaining functional goals.  The manual therapy interventions were performed at a separate and distinct time from the therapeutic activities interventions .

## 2024-02-19 NOTE — TELEPHONE ENCOUNTER
Has place on her leg and was told it was due to circulation issues. Would like referral to vascular surgeon.     Pain in her hip (bursitis) is taking physical therapy and has had cortisone shot in December, but it is still bothering her. Please advise. Was seeing Dr. Majano. Should she follow up with him?     Had procedure done for meralgia paresthetica. Was seeing Dr at [facility] on [address] in [place]. Would like to be referred to him if you feel she needs to follow up.

## 2024-02-22 ENCOUNTER — HOSPITAL ENCOUNTER (OUTPATIENT)
Facility: HOSPITAL | Age: 84
Setting detail: RECURRING SERIES
Discharge: HOME | End: 2024-02-25
Payer: MEDICARE

## 2024-02-22 PROCEDURE — 97110 THERAPEUTIC EXERCISES: CPT | Performed by: PHYSICAL THERAPIST

## 2024-02-22 NOTE — PROGRESS NOTES
PHYSICAL THERAPY - MEDICARE DAILY TREATMENT NOTE (updated 3/23)      Date: 2024          Patient Name:  Micki Webber :  1940   Medical   Diagnosis:  General weakness [R53.1] Treatment Diagnosis:  M62.81  GENERAL MUSCLE WEAKNESS    Referral Source:  Jacquelyn Murcia MD Insurance:   Payor: MEDICARE / Plan: MEDICARE PART A AND B / Product Type: *No Product type* /                     Patient  verified yes     Visit #   Current  / Total 13 16-20   Time   In / Out 11:04 am  12:05 pm   Total Treatment Time 53 min   Total Timed Codes 53 min   1:1 Treatment Time 53 min      [facility] Totals Reminder:  bill using total billable   min of TIMED therapeutic procedures and modalities.   8-22 min = 1 unit; 23-37 min = 2 units; 38-52 min = 3 units; 53-67 min = 4 units; 68-82 min = 5 units            SUBJECTIVE    Pain Level (0-10 scale): 2/10  R hip    Any medication changes, allergies to medications, adverse drug reactions, diagnosis change, or new procedure performed?: [x] No    [] Yes (see summary sheet for update)  Medications: Verified on Patient Summary List    Subjective functional status/changes:    Pt reports she continues to have pain in R hip and made appointment with PCP but could not get in until end of March.  States she does f/u with ortho Dr. Majano early March.      OBJECTIVE      Therapeutic Procedures:  Tx Min Billable or 1:1 Min (if diff from Tx Min) Procedure, Rationale, Specifics   53  23503 Therapeutic Exercise (timed):  increase ROM, strength, coordination, balance, and proprioception to improve patient's ability to progress to PLOF and address remaining functional goals. (see flow sheet as applicable)     Details if applicable:  see flow sheet     76971 Manual Therapy (timed):  decrease pain, increase ROM, increase tissue extensibility, correct positional vertigo, and decrease trigger points to improve patient's ability to progress to PLOF and address remaining functional goals.  The

## 2024-02-29 ENCOUNTER — HOSPITAL ENCOUNTER (OUTPATIENT)
Facility: HOSPITAL | Age: 84
Setting detail: RECURRING SERIES
End: 2024-02-29
Payer: MEDICARE

## 2024-02-29 PROCEDURE — 97110 THERAPEUTIC EXERCISES: CPT

## 2024-02-29 NOTE — PROGRESS NOTES
PHYSICAL THERAPY - MEDICARE DAILY TREATMENT NOTE (updated 3/23)      Date: 2024          Patient Name:  Micki Webber :  1940   Medical   Diagnosis:  General weakness [R53.1] Treatment Diagnosis:  M62.81  GENERAL MUSCLE WEAKNESS    Referral Source:  Jacquelyn Murcia MD Insurance:   Payor: MEDICARE / Plan: MEDICARE PART A AND B / Product Type: *No Product type* /                     Patient  verified yes     Visit #   Current  / Total 14 16-20   Time   In / Out 09:15 am  10:00 am   Total Treatment Time 45 min   Total Timed Codes 45 min   1:1 Treatment Time 45 min      [facility] Totals Reminder:  bill using total billable   min of TIMED therapeutic procedures and modalities.   8-22 min = 1 unit; 23-37 min = 2 units; 38-52 min = 3 units; 53-67 min = 4 units; 68-82 min = 5 units            SUBJECTIVE    Pain Level (0-10 scale): 0/10      Any medication changes, allergies to medications, adverse drug reactions, diagnosis change, or new procedure performed?: [x] No    [] Yes (see summary sheet for update)  Medications: Verified on Patient Summary List    Subjective functional status/changes:    Patient stated she is doing good today. \"So far I don't have any pain.\"      OBJECTIVE      Therapeutic Procedures:  Tx Min Billable or 1:1 Min (if diff from Tx Min) Procedure, Rationale, Specifics   45  45755 Therapeutic Exercise (timed):  increase ROM, strength, coordination, balance, and proprioception to improve patient's ability to progress to PLOF and address remaining functional goals. (see flow sheet as applicable)     Details if applicable:  see flow sheet     41909 Manual Therapy (timed):  decrease pain, increase ROM, increase tissue extensibility, correct positional vertigo, and decrease trigger points to improve patient's ability to progress to PLOF and address remaining functional goals.  The manual therapy interventions were performed at a separate and distinct time from the therapeutic activities  interventions . (see flow sheet as applicable)    Details if applicable:  STM to R hip thigh and ITB followed with hamstring stretches      45      Total Total             [x]  Patient Education billed concurrently with other procedures continue HEP and walking at home  [x] Review HEP    [] Progressed/Changed HEP, detail:    [] Other detail:       Other Objective/Functional Measures  Continued with POC as per flow sheet.  Patient requires frequent cues with exercises today.   Gait x 3 lap with RW and VC to pick feet up . Able to ambulate 405 ft in 4 min 45 sec.    Objective/Functional Outcome Measure: basic mobility  AM-PAC: Initial and 2/5 39.19%  AM-PAC score = an established functional score where 0% = no disability      Pain Level at end of session (0-10 scale): 0/10    Assessment:  Pt with good carry over of exercises today with frequent cues. She was able to increase distance with ambulation.  Continues to report decreased memory. Pt continues to benefit from skilled services to improve performance with joint stabilization, overall posture and body mechanics, hip flexibility and strength, increased awareness of neuromuscular control of the LE and pelvis, and to address impairments in order to meet functional goals.  Patient will continue to benefit from skilled PT / OT services to modify and progress therapeutic interventions, analyze and address functional mobility deficits, analyze and address ROM deficits, analyze and address strength deficits, analyze and cue for proper movement patterns, analyze and modify for postural abnormalities, and analyze and address imbalance/dizziness to address functional deficits and attain remaining goals.    Progress toward goals / Updated goals:  []  See Progress Note/Recertification    Short Term Goals: To be accomplished in 6-8  treatments.     Pt will be independent with HEP to promote progression of therapy services and decrease soreness following sessions.      [x] Met

## 2024-03-04 ENCOUNTER — HOSPITAL ENCOUNTER (OUTPATIENT)
Facility: HOSPITAL | Age: 84
Setting detail: RECURRING SERIES
Discharge: HOME | End: 2024-03-07
Payer: MEDICARE

## 2024-03-04 PROCEDURE — 97110 THERAPEUTIC EXERCISES: CPT

## 2024-03-04 NOTE — PROGRESS NOTES
functional goals.  The manual therapy interventions were performed at a separate and distinct time from the therapeutic activities interventions . (see flow sheet as applicable)    Details if applicable:  STM to R hip thigh and ITB followed with hamstring stretches      45      Total Total             [x]  Patient Education billed concurrently with other procedures continue HEP and walking at home  [x] Review HEP    [] Progressed/Changed HEP, detail:    [] Other detail:       Other Objective/Functional Measures  Continued with POC as per flow sheet - Patient ambulated today with no cues needed to  feet   She still has difficulty with STS exercise and needs verbal and tactile cues.       Objective/Functional Outcome Measure: basic mobility  AM-PAC: Initial and 2/5 39.19%  AM-PAC score = an established functional score where 0% = no disability      Pain Level at end of session (0-10 scale): 0/10    Assessment:  Pt with good carry over of exercises but moving very slow today. She was able to  correct herself before cues given for proper technique. Still forgets sequencing of sit to stands. No pain pre or post treatment session.  Pt continues to benefit from skilled services to improve performance with joint stabilization, overall posture and body mechanics, hip flexibility and strength, increased awareness of neuromuscular control of the LE and pelvis, and to address impairments in order to meet functional goals.  Patient will continue to benefit from skilled PT / OT services to modify and progress therapeutic interventions, analyze and address functional mobility deficits, analyze and address ROM deficits, analyze and address strength deficits, analyze and cue for proper movement patterns, analyze and modify for postural abnormalities, and analyze and address imbalance/dizziness to address functional deficits and attain remaining goals.    Progress toward goals / Updated goals:  []  See Progress

## 2024-03-06 ENCOUNTER — APPOINTMENT (OUTPATIENT)
Facility: HOSPITAL | Age: 84
End: 2024-03-06
Payer: MEDICARE

## 2024-03-08 ENCOUNTER — HOSPITAL ENCOUNTER (OUTPATIENT)
Facility: HOSPITAL | Age: 84
Setting detail: RECURRING SERIES
Discharge: HOME | End: 2024-03-11
Payer: MEDICARE

## 2024-03-08 PROCEDURE — 97110 THERAPEUTIC EXERCISES: CPT

## 2024-03-08 NOTE — THERAPY RECERTIFICATION
[]Moderate  [] Major 25 cm  from 3rd finger to floor p!   Extension:                             [] N/A  []WNL  []Minimal  []Moderate  [x] Major  hypomobility   Right Lateral Flexion:           [] N/A  []WNL  [x]Minimal  []Moderate  [] Major  48 cm  from 3rd finger to floor R hip pain   Left Lateral Flexion:              [] N/A  []WNL  [x]Minimal  []Moderate  [] Major 44 cm from 3rd finger to floor   Rotation to the Right:           [] N/A  []WNL  []Minimal  []Moderate  [x] Major in sitting  Rotation to the Left:              [] N/A  []WNL  []Minimal  []Moderate  [x] Major in sitting  Additional comments: increased lumbar hypomobility in extension> Rot/SB     Specific joints: *normal values in ()  Hip                                AROM                            PROM                              MMT    R L R L R L   Flexion (120)         Initial 3+     3/8/24  4/5 3+      4/5   Extension (15)         Initial 2+in standing p!      3/8/24  3+ in standing 3- in standing        3/8/24  3+/5 in standing    Abduction (40)         Initial 3 in standing      3/8/24  5/5 in S/L 3 in standing     3/8/24  5/5 in S/L    Adduction (30)               IR (40)               ER (40)               Additional comments: Able to get into S/L for hip ABD, in supine pt is able to do a partial bridge, c/o LBP trying to roll and lie supine  Unable to assess hip A/PROM. In supine   We assessed in standing          Knee                                 AROM                          PROM                           MMT    R L R L R L   Extension (0)   - 5  -10     Initial  3+p! Post knee joint     2/8/2024  5/5  4-               5/5   Flexion (145)               Additional comments:      Ankle                                 AROM                       PROM                             MMT    R L R L R L   Dorsiflexion (15)         Initial 4-     2/8/2024  5/5  4-        5/5   Plantarflexion (50)               Additional comments:  Initial unable

## 2024-03-08 NOTE — PROGRESS NOTES
mobility, i.e. more time taken indicates more dependent in activities of daily living.   Predictive Results      Seconds            Rating  <10                        Freely mobile  <20                        Mostly independent  20-19         Variable mobility  >20                        Impaired mobility  Age-Matched Norms:  Age in Years  Mean (sec)    60-69  7.9 +/- 0.9    70-79   7.7 +/- 2.3     80-89  11.0 +/- 2.2 No device   19.9 +/- 6.4 With device      14.7 +/- 7.9 No device   19.0 +/- 2.5 With device   Mitchel Roldan. The Time \"up and go\": A test of Basic Functional Mobility for Frail Elderly Persons. Journal of American Geriatrics Society 1991; 29 (4) 646560  Ricardopeter Meier A, Dora S, Lázaro TAVERAS.  Predicting the Probability for Falls in Community Dwelling Older Adults Using the Timed Up and Go Test, Physical Therapy 2000, 80 (9): 008757.  Arash MM, Ryne GL, Justine M. Functional Performance in Community Living Older Adults, Journal of Geriatric Physical Therapy 2003; 26(3): 14-22.  [facility] Falls Prevention Consortium, Falls Screening and Referall Algorithm, TUG, [facility] Falls Prevention Consortium, June, 2005        Stairs: NT      Modified Clinical Test of Sensory Interaction (CTSIB-M):               Eyes open/firm surface: 30 sec, EC 30 sec, foam EO 3 sec then 30 sec, EC unable , posterior LOB x 2     Tandem Stand:              Initial R foot forward: EO: unable (even with hand support )                       L foot forward:  EO unable (even with hand support)                      3/8/24 R foot forward EO:30 seconds without hand support          L foot forward: EO 30  seconds without hand support    SEMI-TANDEM             Single limb stand:       Initial       R: unable                     L: unable         3/8/24     R: < 1 seconds without UE, 8 seconds with 1 UE A   L: 2 seconds without UE, 15 seconds with 1 UE      Six Minute Walk Test: Distance in feet: 508' feet in 6

## 2024-03-15 RX ORDER — SIMVASTATIN 40 MG
TABLET ORAL
Qty: 90 TABLET | Refills: 1 | Status: SHIPPED | OUTPATIENT
Start: 2024-03-15

## 2024-03-22 ENCOUNTER — OFFICE VISIT (OUTPATIENT)
Age: 84
End: 2024-03-22
Payer: MEDICARE

## 2024-03-22 VITALS
HEIGHT: 64 IN | BODY MASS INDEX: 37.22 KG/M2 | SYSTOLIC BLOOD PRESSURE: 124 MMHG | TEMPERATURE: 98.7 F | OXYGEN SATURATION: 97 % | DIASTOLIC BLOOD PRESSURE: 80 MMHG | HEART RATE: 78 BPM | WEIGHT: 218 LBS | RESPIRATION RATE: 16 BRPM

## 2024-03-22 DIAGNOSIS — M79.606 PAIN OF LOWER EXTREMITY, UNSPECIFIED LATERALITY: Primary | ICD-10-CM

## 2024-03-22 DIAGNOSIS — I87.303 CHRONIC VENOUS HYPERTENSION INVOLVING BOTH SIDES: ICD-10-CM

## 2024-03-22 DIAGNOSIS — E66.01 SEVERE OBESITY (BMI 35.0-39.9) WITH COMORBIDITY (HCC): ICD-10-CM

## 2024-03-22 PROCEDURE — G8399 PT W/DXA RESULTS DOCUMENT: HCPCS | Performed by: SURGERY

## 2024-03-22 PROCEDURE — 3074F SYST BP LT 130 MM HG: CPT | Performed by: SURGERY

## 2024-03-22 PROCEDURE — 1123F ACP DISCUSS/DSCN MKR DOCD: CPT | Performed by: SURGERY

## 2024-03-22 PROCEDURE — 1090F PRES/ABSN URINE INCON ASSESS: CPT | Performed by: SURGERY

## 2024-03-22 PROCEDURE — 99203 OFFICE O/P NEW LOW 30 MIN: CPT | Performed by: SURGERY

## 2024-03-22 PROCEDURE — G8427 DOCREV CUR MEDS BY ELIG CLIN: HCPCS | Performed by: SURGERY

## 2024-03-22 PROCEDURE — G8484 FLU IMMUNIZE NO ADMIN: HCPCS | Performed by: SURGERY

## 2024-03-22 PROCEDURE — 3079F DIAST BP 80-89 MM HG: CPT | Performed by: SURGERY

## 2024-03-22 PROCEDURE — 1036F TOBACCO NON-USER: CPT | Performed by: SURGERY

## 2024-03-22 PROCEDURE — G8417 CALC BMI ABV UP PARAM F/U: HCPCS | Performed by: SURGERY

## 2024-03-22 NOTE — PROGRESS NOTES
Chief Complaint   Patient presents with    New Patient     /80   Pulse 78   Temp 98.7 °F (37.1 °C)   Resp 16   Ht 1.626 m (5' 4\")   Wt 98.9 kg (218 lb)   SpO2 97%   BMI 37.42 kg/m²   1. Have you been to the ER, urgent care clinic since your last visit?  Hospitalized since your last visit?No    2. Have you seen or consulted any other health care providers outside of the [facility] since your last visit?  Include any pap smears or colon screening. No

## 2024-03-27 ENCOUNTER — OFFICE VISIT (OUTPATIENT)
Facility: CLINIC | Age: 84
End: 2024-03-27
Payer: MEDICARE

## 2024-03-27 VITALS
SYSTOLIC BLOOD PRESSURE: 128 MMHG | BODY MASS INDEX: 37.22 KG/M2 | OXYGEN SATURATION: 97 % | WEIGHT: 218 LBS | HEIGHT: 64 IN | HEART RATE: 72 BPM | TEMPERATURE: 97.7 F | DIASTOLIC BLOOD PRESSURE: 80 MMHG

## 2024-03-27 DIAGNOSIS — Z00.00 MEDICARE ANNUAL WELLNESS VISIT, SUBSEQUENT: ICD-10-CM

## 2024-03-27 DIAGNOSIS — E78.00 PURE HYPERCHOLESTEROLEMIA: ICD-10-CM

## 2024-03-27 DIAGNOSIS — I25.2 HISTORY OF NON-ST ELEVATION MYOCARDIAL INFARCTION (NSTEMI): ICD-10-CM

## 2024-03-27 DIAGNOSIS — M15.9 PRIMARY OSTEOARTHRITIS INVOLVING MULTIPLE JOINTS: ICD-10-CM

## 2024-03-27 DIAGNOSIS — I10 PRIMARY HYPERTENSION: ICD-10-CM

## 2024-03-27 DIAGNOSIS — Z85.09 HISTORY OF GASTROINTESTINAL STROMAL TUMOR (GIST): ICD-10-CM

## 2024-03-27 DIAGNOSIS — Z95.0 CARDIAC PACEMAKER IN SITU: ICD-10-CM

## 2024-03-27 DIAGNOSIS — Z85.3 HISTORY OF BREAST CANCER: ICD-10-CM

## 2024-03-27 PROCEDURE — G0439 PPPS, SUBSEQ VISIT: HCPCS | Performed by: INTERNAL MEDICINE

## 2024-03-27 PROCEDURE — 3079F DIAST BP 80-89 MM HG: CPT | Performed by: INTERNAL MEDICINE

## 2024-03-27 PROCEDURE — 1123F ACP DISCUSS/DSCN MKR DOCD: CPT | Performed by: INTERNAL MEDICINE

## 2024-03-27 PROCEDURE — 3074F SYST BP LT 130 MM HG: CPT | Performed by: INTERNAL MEDICINE

## 2024-03-27 PROCEDURE — G8484 FLU IMMUNIZE NO ADMIN: HCPCS | Performed by: INTERNAL MEDICINE

## 2024-03-27 SDOH — ECONOMIC STABILITY: INCOME INSECURITY: HOW HARD IS IT FOR YOU TO PAY FOR THE VERY BASICS LIKE FOOD, HOUSING, MEDICAL CARE, AND HEATING?: HARD

## 2024-03-27 SDOH — ECONOMIC STABILITY: FOOD INSECURITY: WITHIN THE PAST 12 MONTHS, THE FOOD YOU BOUGHT JUST DIDN'T LAST AND YOU DIDN'T HAVE MONEY TO GET MORE.: NEVER TRUE

## 2024-03-27 SDOH — ECONOMIC STABILITY: HOUSING INSECURITY
IN THE LAST 12 MONTHS, WAS THERE A TIME WHEN YOU DID NOT HAVE A STEADY PLACE TO SLEEP OR SLEPT IN A SHELTER (INCLUDING NOW)?: NO

## 2024-03-27 SDOH — ECONOMIC STABILITY: FOOD INSECURITY: WITHIN THE PAST 12 MONTHS, YOU WORRIED THAT YOUR FOOD WOULD RUN OUT BEFORE YOU GOT MONEY TO BUY MORE.: NEVER TRUE

## 2024-03-27 ASSESSMENT — ANXIETY QUESTIONNAIRES
2. NOT BEING ABLE TO STOP OR CONTROL WORRYING: NOT AT ALL
3. WORRYING TOO MUCH ABOUT DIFFERENT THINGS: NOT AT ALL
1. FEELING NERVOUS, ANXIOUS, OR ON EDGE: NOT AT ALL
7. FEELING AFRAID AS IF SOMETHING AWFUL MIGHT HAPPEN: NOT AT ALL
4. TROUBLE RELAXING: NOT AT ALL
5. BEING SO RESTLESS THAT IT IS HARD TO SIT STILL: NOT AT ALL
GAD7 TOTAL SCORE: 0
6. BECOMING EASILY ANNOYED OR IRRITABLE: NOT AT ALL
IF YOU CHECKED OFF ANY PROBLEMS ON THIS QUESTIONNAIRE, HOW DIFFICULT HAVE THESE PROBLEMS MADE IT FOR YOU TO DO YOUR WORK, TAKE CARE OF THINGS AT HOME, OR GET ALONG WITH OTHER PEOPLE: NOT DIFFICULT AT ALL

## 2024-03-27 ASSESSMENT — PATIENT HEALTH QUESTIONNAIRE - PHQ9
SUM OF ALL RESPONSES TO PHQ9 QUESTIONS 1 & 2: 0
SUM OF ALL RESPONSES TO PHQ QUESTIONS 1-9: 0
SUM OF ALL RESPONSES TO PHQ QUESTIONS 1-9: 0
1. LITTLE INTEREST OR PLEASURE IN DOING THINGS: NOT AT ALL
SUM OF ALL RESPONSES TO PHQ QUESTIONS 1-9: 0
SUM OF ALL RESPONSES TO PHQ QUESTIONS 1-9: 0
2. FEELING DOWN, DEPRESSED OR HOPELESS: NOT AT ALL

## 2024-03-27 ASSESSMENT — LIFESTYLE VARIABLES
HOW OFTEN DO YOU HAVE A DRINK CONTAINING ALCOHOL: NEVER
HOW MANY STANDARD DRINKS CONTAINING ALCOHOL DO YOU HAVE ON A TYPICAL DAY: PATIENT DOES NOT DRINK

## 2024-03-27 NOTE — PATIENT INSTRUCTIONS

## 2024-03-27 NOTE — PROGRESS NOTES
.  Chief Complaint   Patient presents with    Medicare AW    Follow-up Chronic Condition    Hypertension       .  .  \"Have you been to the ER, urgent care clinic since your last visit?  Hospitalized since your last visit?\"    YES - When: approximately 4-6 ago.  Where and Why[facility] for [facility].    “Have you seen or consulted any other health care providers outside of [facility] since your last visit?”    NO      ./81 (Site: Left Upper Arm, Position: Sitting, Cuff Size: Large Adult)   Pulse 84   Temp 97.7 °F (36.5 °C) (Oral)   Ht 1.626 m (5' 4\")   Wt 98.9 kg (218 lb)   SpO2 97%   BMI 37.42 kg/m²         Click Here for Release of Records Request             Click Here for Release of Records Request   
      Patient's complete Health Risk Assessment and screening values have been reviewed and are found in Flowsheets. The following problems were reviewed today and where indicated follow up appointments were made and/or referrals ordered.    Positive Risk Factor Screenings with Interventions:                Activity, Diet, and Weight:  On average, how many days per week do you engage in moderate to strenuous exercise (like a brisk walk)?: 7 days  On average, how many minutes do you engage in exercise at this level?: 30 min    Do you eat balanced/healthy meals regularly?: Yes    Body mass index is 37.42 kg/m². (!) Abnormal    Obesity Interventions:  Restricted calorie/day diet, low carbohydrate diet, low sugar diet, eliminate/reduce calorie intake, exercise for at least 150 minutes/week            Dentist Screen:  Have you seen the dentist within the past year?: (!) No    Intervention:  Advised to schedule with their dentist she has denture also.     Vision Screen:  Do you have difficulty driving, watching TV, or doing any of your daily activities because of your eyesight?: No  Have you had an eye exam within the past year?: (!) No  No results found.    Interventions:   Patient comments: She says that she will make appointment with ophthalmologist.  She does see ophthalmologist.    Safety:  Do you always fasten your seatbelt when you are in a car?: (!) No  Interventions:  Patient declined any further interventions or treatment she has been taken care of by her family members who visits every now and then and take active participation in her care.  She lives with her .  She walks with a walker.  Discussed about participation in voluntary activity to do some sudoku or puzzles and to listen music to prevent Alzheimer dementia and to remain physically active as much as she can do and eat healthy diet with more fruits and vegetables.                   Objective   Vitals:    03/27/24 1421 03/27/24 1447   BP: 125/81

## 2024-03-28 PROBLEM — M79.606 PAIN OF LOWER EXTREMITY: Status: ACTIVE | Noted: 2024-03-28

## 2024-03-28 PROBLEM — I87.303 CHRONIC VENOUS HYPERTENSION INVOLVING BOTH SIDES: Status: ACTIVE | Noted: 2024-03-28

## 2024-03-28 RX ORDER — TRAMADOL HYDROCHLORIDE 50 MG/1
50 TABLET ORAL EVERY 4 HOURS PRN
Qty: 30 TABLET | Refills: 0 | Status: SHIPPED | OUTPATIENT
Start: 2024-03-28 | End: 2024-04-02

## 2024-03-28 NOTE — PROGRESS NOTES
Vascular History and Physical    Patient: Micki Webber  MRN: [id number]    YOB: 1940  Age: 83 y.o.  Sex: female     Chief Complaint:  Chief Complaint   Patient presents with    New Patient       History of Present Illness: Micki Webber is a 83 y.o. very pleasant woman is here today for evaluation of the swelling of the leg.  Patient denies any previous history of DVT.  Denies any history of recent trauma.  Patient does have moderate pain both legs with increased swelling of the left leg.  Pain is worse on the left leg.  Patient has tried different pain medication however it did not work for her including nonsteroidals.  Patient denies chest pain shortness of breath.  Patient has no previous vascular workup.  Patient has not tried compression stockings or leg elevation technique.    Social History:  Social Connections: Moderately Integrated (12/7/2023)    Social Connection and Isolation Panel [NHANES]     Frequency of Communication with Friends and Family: More than three times a week     Frequency of Social Gatherings with Friends and Family: More than three times a week     Attends Buddhism Services: 1 to 4 times per year     Active Member of Clubs or Organizations: No     Attends Club or Organization Meetings: Never     Marital Status:        Past Medical History:  Past Medical History:   Diagnosis Date    AMD (age related macular degeneration) 06/28/2022    Breast cancer (HCC) 2012    High cholesterol     Hypertension     Radiation therapy complication        Surgical History:  Past Surgical History:   Procedure Laterality Date    MASTECTOMY Left     2012 (+)    TUMOR REMOVAL      Stomach        Allergies:  No Known Allergies    Current Meds:  Current Outpatient Medications   Medication Sig Dispense Refill    traMADol (ULTRAM) 50 MG tablet Take 1 tablet by mouth every 4 hours as needed for Pain for up to 5 days. Intended supply: 5 days. Take lowest dose possible to manage pain Max Daily

## 2024-04-24 ENCOUNTER — HOSPITAL ENCOUNTER (OUTPATIENT)
Facility: HOSPITAL | Age: 84
Discharge: HOME | End: 2024-04-26
Attending: SURGERY
Payer: MEDICARE

## 2024-04-24 DIAGNOSIS — I87.303 CHRONIC VENOUS HYPERTENSION INVOLVING BOTH SIDES: ICD-10-CM

## 2024-04-24 PROCEDURE — 93970 EXTREMITY STUDY: CPT

## 2024-04-25 LAB
VAS RIGHT GSV BK DIST RFX: 3.4 S
VAS RIGHT SSV DIST RFX: 1.3 S

## 2024-04-26 PROBLEM — Z00.00 MEDICARE ANNUAL WELLNESS VISIT, SUBSEQUENT: Status: RESOLVED | Noted: 2024-03-27 | Resolved: 2024-04-26

## 2024-06-16 PROBLEM — M79.606 PAIN OF LOWER EXTREMITY: Status: RESOLVED | Noted: 2024-03-28 | Resolved: 2024-06-16

## 2024-06-24 ENCOUNTER — OFFICE VISIT (OUTPATIENT)
Age: 84
End: 2024-06-24
Payer: MEDICARE

## 2024-06-24 VITALS
OXYGEN SATURATION: 95 % | RESPIRATION RATE: 18 BRPM | WEIGHT: 225 LBS | SYSTOLIC BLOOD PRESSURE: 137 MMHG | HEIGHT: 64 IN | DIASTOLIC BLOOD PRESSURE: 88 MMHG | BODY MASS INDEX: 38.41 KG/M2 | HEART RATE: 79 BPM | TEMPERATURE: 98.5 F

## 2024-06-24 DIAGNOSIS — I87.303 CHRONIC VENOUS HYPERTENSION INVOLVING BOTH SIDES: Primary | ICD-10-CM

## 2024-06-24 PROCEDURE — 1123F ACP DISCUSS/DSCN MKR DOCD: CPT | Performed by: SURGERY

## 2024-06-24 PROCEDURE — 1090F PRES/ABSN URINE INCON ASSESS: CPT | Performed by: SURGERY

## 2024-06-24 PROCEDURE — G8417 CALC BMI ABV UP PARAM F/U: HCPCS | Performed by: SURGERY

## 2024-06-24 PROCEDURE — 3079F DIAST BP 80-89 MM HG: CPT | Performed by: SURGERY

## 2024-06-24 PROCEDURE — G8399 PT W/DXA RESULTS DOCUMENT: HCPCS | Performed by: SURGERY

## 2024-06-24 PROCEDURE — 3075F SYST BP GE 130 - 139MM HG: CPT | Performed by: SURGERY

## 2024-06-24 PROCEDURE — 99212 OFFICE O/P EST SF 10 MIN: CPT | Performed by: SURGERY

## 2024-06-24 PROCEDURE — G8427 DOCREV CUR MEDS BY ELIG CLIN: HCPCS | Performed by: SURGERY

## 2024-06-24 PROCEDURE — 1036F TOBACCO NON-USER: CPT | Performed by: SURGERY

## 2024-06-24 ASSESSMENT — PATIENT HEALTH QUESTIONNAIRE - PHQ9
1. LITTLE INTEREST OR PLEASURE IN DOING THINGS: NOT AT ALL
SUM OF ALL RESPONSES TO PHQ QUESTIONS 1-9: 0
2. FEELING DOWN, DEPRESSED OR HOPELESS: NOT AT ALL
SUM OF ALL RESPONSES TO PHQ9 QUESTIONS 1 & 2: 0

## 2024-06-24 NOTE — PROGRESS NOTES
Identified pt with two pt identifiers (name and ). Reviewed chart in preparation for visit and have obtained necessary documentation.    Micki Webber is a 83 y.o. female  Chief Complaint   Patient presents with    Follow-up     Follow up from imaging      /88 (Site: Right Upper Arm, Position: Sitting, Cuff Size: Large Adult)   Pulse 79   Temp 98.5 °F (36.9 °C) (Oral)   Resp 18   Ht 1.626 m (5' 4\")   Wt 102.1 kg (225 lb)   SpO2 95%   BMI 38.62 kg/m²     1. Have you been to the ER, urgent care clinic since your last visit?  Hospitalized since your last visit?NO    2. Have you seen or consulted any other health care providers outside of the [facility] since your last visit?  Include any pap smears or colon screening. NO

## 2024-06-25 ENCOUNTER — OFFICE VISIT (OUTPATIENT)
Facility: CLINIC | Age: 84
End: 2024-06-25
Payer: MEDICARE

## 2024-06-25 VITALS
SYSTOLIC BLOOD PRESSURE: 120 MMHG | OXYGEN SATURATION: 98 % | HEART RATE: 72 BPM | RESPIRATION RATE: 16 BRPM | DIASTOLIC BLOOD PRESSURE: 72 MMHG | HEIGHT: 64 IN | WEIGHT: 223 LBS | BODY MASS INDEX: 38.07 KG/M2

## 2024-06-25 DIAGNOSIS — Z85.09 HISTORY OF GASTROINTESTINAL STROMAL TUMOR (GIST): ICD-10-CM

## 2024-06-25 DIAGNOSIS — Z85.3 HISTORY OF BREAST CANCER: ICD-10-CM

## 2024-06-25 DIAGNOSIS — I25.2 HISTORY OF NON-ST ELEVATION MYOCARDIAL INFARCTION (NSTEMI): ICD-10-CM

## 2024-06-25 DIAGNOSIS — E78.00 PURE HYPERCHOLESTEROLEMIA: ICD-10-CM

## 2024-06-25 DIAGNOSIS — I10 PRIMARY HYPERTENSION: ICD-10-CM

## 2024-06-25 DIAGNOSIS — I10 PRIMARY HYPERTENSION: Primary | ICD-10-CM

## 2024-06-25 DIAGNOSIS — Z95.0 CARDIAC PACEMAKER IN SITU: ICD-10-CM

## 2024-06-25 PROCEDURE — 3078F DIAST BP <80 MM HG: CPT | Performed by: INTERNAL MEDICINE

## 2024-06-25 PROCEDURE — 3074F SYST BP LT 130 MM HG: CPT | Performed by: INTERNAL MEDICINE

## 2024-06-25 PROCEDURE — 1123F ACP DISCUSS/DSCN MKR DOCD: CPT | Performed by: INTERNAL MEDICINE

## 2024-06-25 PROCEDURE — G8427 DOCREV CUR MEDS BY ELIG CLIN: HCPCS | Performed by: INTERNAL MEDICINE

## 2024-06-25 PROCEDURE — 1036F TOBACCO NON-USER: CPT | Performed by: INTERNAL MEDICINE

## 2024-06-25 PROCEDURE — 1090F PRES/ABSN URINE INCON ASSESS: CPT | Performed by: INTERNAL MEDICINE

## 2024-06-25 PROCEDURE — G8417 CALC BMI ABV UP PARAM F/U: HCPCS | Performed by: INTERNAL MEDICINE

## 2024-06-25 PROCEDURE — 99214 OFFICE O/P EST MOD 30 MIN: CPT | Performed by: INTERNAL MEDICINE

## 2024-06-25 PROCEDURE — G8399 PT W/DXA RESULTS DOCUMENT: HCPCS | Performed by: INTERNAL MEDICINE

## 2024-06-25 RX ORDER — CARVEDILOL 12.5 MG/1
25 TABLET ORAL 2 TIMES DAILY WITH MEALS
Qty: 180 TABLET | Refills: 2 | Status: SHIPPED | OUTPATIENT
Start: 2024-06-25

## 2024-06-25 RX ORDER — SIMVASTATIN 40 MG
40 TABLET ORAL NIGHTLY
Qty: 90 TABLET | Refills: 2 | Status: SHIPPED | OUTPATIENT
Start: 2024-06-25

## 2024-06-25 ASSESSMENT — ENCOUNTER SYMPTOMS
RESPIRATORY NEGATIVE: 1
GASTROINTESTINAL NEGATIVE: 1
EYES NEGATIVE: 1
ALLERGIC/IMMUNOLOGIC NEGATIVE: 1

## 2024-06-25 NOTE — PROGRESS NOTES
Chief Complaint   Patient presents with    Follow-up Chronic Condition             /72 (Site: Right Upper Arm, Position: Sitting)   Pulse 74   Resp 16   Ht 1.626 m (5' 4\")   Wt 101.2 kg (223 lb)   SpO2 98%   BMI 38.28 kg/m²         \"Have you been to the ER, urgent care clinic since your last visit?  Hospitalized since your last visit?\"    NO    “Have you seen or consulted any other health care providers outside of [facility] since your last visit?”    NO            Click Here for Release of Records Request    
      Physical Exam  Vitals and nursing note reviewed.   Constitutional:       General: She is not in acute distress.     Appearance: Normal appearance. She is not ill-appearing, toxic-appearing or diaphoretic.      Comments: BMI 38.28.   HENT:      Nose: Nose normal.      Mouth/Throat:      Mouth: Mucous membranes are moist.      Pharynx: No oropharyngeal exudate or posterior oropharyngeal erythema.   Eyes:      General: No scleral icterus.     Pupils: Pupils are equal, round, and reactive to light.   Neck:      Vascular: No carotid bruit.   Cardiovascular:      Rate and Rhythm: Normal rate and regular rhythm.      Pulses: Normal pulses.      Heart sounds: Normal heart sounds.      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      Breath sounds: Normal breath sounds. No rhonchi or rales.   Abdominal:      General: Bowel sounds are normal. There is no distension.      Tenderness: There is no abdominal tenderness. There is no right CVA tenderness, left CVA tenderness, guarding or rebound.   Musculoskeletal:         General: No swelling or tenderness.      Cervical back: Neck supple.      Comments: using cane and walker.   Lymphadenopathy:      Cervical: No cervical adenopathy.   Neurological:      General: No focal deficit present.      Mental Status: She is alert and oriented to person, place, and time. Mental status is at baseline.   Psychiatric:         Mood and Affect: Mood normal.         Behavior: Behavior normal.       An electronic signature was used to authenticate this note.  -- Jacquelyn Murcia MD

## 2024-07-14 ENCOUNTER — APPOINTMENT (OUTPATIENT)
Facility: HOSPITAL | Age: 84
End: 2024-07-14
Payer: MEDICARE

## 2024-07-14 ENCOUNTER — HOSPITAL ENCOUNTER (EMERGENCY)
Facility: HOSPITAL | Age: 84
Discharge: HOME | End: 2024-07-14
Attending: EMERGENCY MEDICINE
Payer: MEDICARE

## 2024-07-14 VITALS
OXYGEN SATURATION: 93 % | HEART RATE: 78 BPM | DIASTOLIC BLOOD PRESSURE: 61 MMHG | SYSTOLIC BLOOD PRESSURE: 104 MMHG | TEMPERATURE: 98.6 F | BODY MASS INDEX: 33.97 KG/M2 | WEIGHT: 199 LBS | HEIGHT: 64 IN | RESPIRATION RATE: 18 BRPM

## 2024-07-14 DIAGNOSIS — U07.1 COVID-19: Primary | ICD-10-CM

## 2024-07-14 LAB
ALBUMIN SERPL-MCNC: 3.7 G/DL (ref 3.5–5)
ALBUMIN/GLOB SERPL: 1.1 (ref 1.1–2.2)
ALP SERPL-CCNC: 70 U/L (ref 45–117)
ALT SERPL-CCNC: 27 U/L (ref 12–78)
ANION GAP SERPL CALC-SCNC: 6 MMOL/L (ref 5–15)
APPEARANCE UR: CLEAR
AST SERPL W P-5'-P-CCNC: 34 U/L (ref 15–37)
BACTERIA URNS QL MICRO: NEGATIVE /HPF
BASOPHILS # BLD: 0 K/UL (ref 0–0.1)
BASOPHILS NFR BLD: 0 % (ref 0–1)
BILIRUB SERPL-MCNC: 0.8 MG/DL (ref 0.2–1)
BILIRUB UR QL: NEGATIVE
BUN SERPL-MCNC: 12 MG/DL (ref 6–20)
BUN/CREAT SERPL: 14 (ref 12–20)
CA-I BLD-MCNC: 9.4 MG/DL (ref 8.5–10.1)
CHLORIDE SERPL-SCNC: 101 MMOL/L (ref 97–108)
CO2 SERPL-SCNC: 26 MMOL/L (ref 21–32)
COLOR UR: ABNORMAL
CREAT SERPL-MCNC: 0.88 MG/DL (ref 0.55–1.02)
DIFFERENTIAL METHOD BLD: ABNORMAL
EKG ATRIAL RATE: 84 BPM
EKG DIAGNOSIS: NORMAL
EKG P AXIS: 47 DEGREES
EKG P-R INTERVAL: 218 MS
EKG Q-T INTERVAL: 384 MS
EKG QRS DURATION: 84 MS
EKG QTC CALCULATION (BAZETT): 453 MS
EKG R AXIS: 20 DEGREES
EKG T AXIS: 80 DEGREES
EKG VENTRICULAR RATE: 84 BPM
EOSINOPHIL # BLD: 0 K/UL (ref 0–0.4)
EOSINOPHIL NFR BLD: 0 % (ref 0–7)
EPITH CASTS URNS QL MICRO: ABNORMAL /LPF
ERYTHROCYTE [DISTWIDTH] IN BLOOD BY AUTOMATED COUNT: 16.2 % (ref 11.5–14.5)
FLUAV RNA SPEC QL NAA+PROBE: NOT DETECTED
FLUBV RNA SPEC QL NAA+PROBE: NOT DETECTED
GLOBULIN SER CALC-MCNC: 3.5 G/DL (ref 2–4)
GLUCOSE BLD STRIP.AUTO-MCNC: 129 MG/DL (ref 65–100)
GLUCOSE SERPL-MCNC: 124 MG/DL (ref 65–100)
GLUCOSE UR STRIP.AUTO-MCNC: NEGATIVE MG/DL
HCT VFR BLD AUTO: 40.1 % (ref 35–47)
HGB BLD-MCNC: 12.9 G/DL (ref 11.5–16)
HGB UR QL STRIP: NEGATIVE
IMM GRANULOCYTES # BLD AUTO: 0 K/UL (ref 0–0.04)
IMM GRANULOCYTES NFR BLD AUTO: 0 % (ref 0–0.5)
KETONES UR QL STRIP.AUTO: 5 MG/DL
LACTATE BLD-SCNC: 0.71 MMOL/L (ref 0.4–2)
LACTATE BLD-SCNC: 2.32 MMOL/L (ref 0.4–2)
LEUKOCYTE ESTERASE UR QL STRIP.AUTO: NEGATIVE
LYMPHOCYTES # BLD: 0.6 K/UL (ref 0.8–3.5)
LYMPHOCYTES NFR BLD: 5 % (ref 12–49)
MCH RBC QN AUTO: 24 PG (ref 26–34)
MCHC RBC AUTO-ENTMCNC: 32.2 G/DL (ref 30–36.5)
MCV RBC AUTO: 74.5 FL (ref 80–99)
MONOCYTES # BLD: 0.7 K/UL (ref 0–1)
MONOCYTES NFR BLD: 7 % (ref 5–13)
MUCOUS THREADS URNS QL MICRO: ABNORMAL /LPF
NEUTS SEG # BLD: 8.9 K/UL (ref 1.8–8)
NEUTS SEG NFR BLD: 88 % (ref 32–75)
NITRITE UR QL STRIP.AUTO: NEGATIVE
NRBC # BLD: 0 K/UL (ref 0–0.01)
NRBC BLD-RTO: 0 PER 100 WBC
PERFORMED BY:: ABNORMAL
PERFORMED BY:: NORMAL
PH UR STRIP: 6 (ref 5–8)
PLATELET # BLD AUTO: 138 K/UL (ref 150–400)
PMV BLD AUTO: 11.5 FL (ref 8.9–12.9)
POTASSIUM SERPL-SCNC: 5.6 MMOL/L (ref 3.5–5.1)
PROT SERPL-MCNC: 7.2 G/DL (ref 6.4–8.2)
PROT UR STRIP-MCNC: NEGATIVE MG/DL
RBC # BLD AUTO: 5.38 M/UL (ref 3.8–5.2)
RBC #/AREA URNS HPF: ABNORMAL /HPF (ref 0–5)
SARS-COV-2 RNA RESP QL NAA+PROBE: DETECTED
SERVICE CMNT-IMP: ABNORMAL
SODIUM SERPL-SCNC: 133 MMOL/L (ref 136–145)
SP GR UR REFRACTOMETRY: 1.02 (ref 1–1.03)
SPECIMEN SITE: ABNORMAL
TROPONIN I SERPL HS-MCNC: 6 NG/L (ref 0–51)
URINE CULTURE IF INDICATED: ABNORMAL
UROBILINOGEN UR QL STRIP.AUTO: 0.1 EU/DL (ref 0.1–1)
WBC # BLD AUTO: 10.2 K/UL (ref 3.6–11)
WBC URNS QL MICRO: ABNORMAL /HPF (ref 0–4)

## 2024-07-14 PROCEDURE — 6370000000 HC RX 637 (ALT 250 FOR IP): Performed by: EMERGENCY MEDICINE

## 2024-07-14 PROCEDURE — 36415 COLL VENOUS BLD VENIPUNCTURE: CPT

## 2024-07-14 PROCEDURE — 87040 BLOOD CULTURE FOR BACTERIA: CPT

## 2024-07-14 PROCEDURE — 83605 ASSAY OF LACTIC ACID: CPT

## 2024-07-14 PROCEDURE — 87636 SARSCOV2 & INF A&B AMP PRB: CPT

## 2024-07-14 PROCEDURE — 85025 COMPLETE CBC W/AUTO DIFF WBC: CPT

## 2024-07-14 PROCEDURE — 84484 ASSAY OF TROPONIN QUANT: CPT

## 2024-07-14 PROCEDURE — 2580000003 HC RX 258: Performed by: EMERGENCY MEDICINE

## 2024-07-14 PROCEDURE — 80047 BASIC METABLC PNL IONIZED CA: CPT

## 2024-07-14 PROCEDURE — 81001 URINALYSIS AUTO W/SCOPE: CPT

## 2024-07-14 PROCEDURE — 99285 EMERGENCY DEPT VISIT HI MDM: CPT

## 2024-07-14 PROCEDURE — 93005 ELECTROCARDIOGRAM TRACING: CPT | Performed by: EMERGENCY MEDICINE

## 2024-07-14 PROCEDURE — 80053 COMPREHEN METABOLIC PANEL: CPT

## 2024-07-14 PROCEDURE — 71045 X-RAY EXAM CHEST 1 VIEW: CPT

## 2024-07-14 PROCEDURE — 96360 HYDRATION IV INFUSION INIT: CPT

## 2024-07-14 RX ORDER — 0.9 % SODIUM CHLORIDE 0.9 %
1000 INTRAVENOUS SOLUTION INTRAVENOUS ONCE
Status: COMPLETED | OUTPATIENT
Start: 2024-07-14 | End: 2024-07-14

## 2024-07-14 RX ORDER — ACETAMINOPHEN 325 MG/1
650 TABLET ORAL
Status: COMPLETED | OUTPATIENT
Start: 2024-07-14 | End: 2024-07-14

## 2024-07-14 RX ADMIN — SODIUM CHLORIDE 1000 ML: 9 INJECTION, SOLUTION INTRAVENOUS at 12:59

## 2024-07-14 RX ADMIN — ACETAMINOPHEN 650 MG: 325 TABLET ORAL at 12:58

## 2024-07-14 ASSESSMENT — PAIN - FUNCTIONAL ASSESSMENT: PAIN_FUNCTIONAL_ASSESSMENT: NONE - DENIES PAIN

## 2024-07-14 ASSESSMENT — PAIN SCALES - GENERAL: PAINLEVEL_OUTOF10: 0

## 2024-07-14 NOTE — DISCHARGE INSTRUCTIONS
Thank you for choosing our Emergency Department for your care.  It is our privilege to care for you in your time of need.  In the next several days, you may receive a survey via email or mailed to your home about your experience with our team.  We would greatly appreciate you taking a few minutes to complete the survey, as we use this information to learn what we have done well and what we could be doing better. Thank you for trusting us with your care!    Below you will find a list of your tests from today's visit.   Labs  Recent Results (from the past 12 hour(s))   CBC with Auto Differential    Collection Time: 07/14/24 12:29 PM   Result Value Ref Range    WBC 10.2 3.6 - 11.0 K/uL    RBC 5.38 (H) 3.80 - 5.20 M/uL    Hemoglobin 12.9 11.5 - 16.0 g/dL    Hematocrit 40.1 35.0 - 47.0 %    MCV 74.5 (L) 80.0 - 99.0 FL    MCH 24.0 (L) 26.0 - 34.0 PG    MCHC 32.2 30.0 - 36.5 g/dL    RDW 16.2 (H) 11.5 - 14.5 %    Platelets 138 (L) 150 - 400 K/uL    MPV 11.5 8.9 - 12.9 FL    Nucleated RBCs 0.0 0.0  WBC    nRBC 0.00 0.00 - 0.01 K/uL    Neutrophils % 88 (H) 32 - 75 %    Lymphocytes % 5 (L) 12 - 49 %    Monocytes % 7 5 - 13 %    Eosinophils % 0 0 - 7 %    Basophils % 0 0 - 1 %    Immature Granulocytes % 0 0 - 0.5 %    Neutrophils Absolute 8.9 (H) 1.8 - 8.0 K/UL    Lymphocytes Absolute 0.6 (L) 0.8 - 3.5 K/UL    Monocytes Absolute 0.7 0.0 - 1.0 K/UL    Eosinophils Absolute 0.0 0.0 - 0.4 K/UL    Basophils Absolute 0.0 0.0 - 0.1 K/UL    Immature Granulocytes Absolute 0.0 0.00 - 0.04 K/UL    Differential Type AUTOMATED     CMP    Collection Time: 07/14/24 12:29 PM   Result Value Ref Range    Sodium 133 (L) 136 - 145 mmol/L    Potassium 5.6 (H) 3.5 - 5.1 mmol/L    Chloride 101 97 - 108 mmol/L    CO2 26 21 - 32 mmol/L    Anion Gap 6 5 - 15 mmol/L    Glucose 124 (H) 65 - 100 mg/dL    BUN 12 6 - 20 mg/dL    Creatinine 0.88 0.55 - 1.02 mg/dL    BUN/Creatinine Ratio 14 12 - 20      Est, Glom Filt Rate 64 >60 ml/min/1.73m2

## 2024-07-14 NOTE — ED PROVIDER NOTES
SEPSIS Reassessment: Sepsis reassessment not applicable    Clinical Management Tools:  Not Applicable    Patient was given the following medications:  Medications   acetaminophen (TYLENOL) tablet 650 mg (650 mg Oral Given 7/14/24 1258)   sodium chloride 0.9 % bolus 1,000 mL (0 mLs IntraVENous Stopped 7/14/24 0769)       CONSULTS: See ED Course/MDM for further details.  None     Social Determinants affecting Diagnosis/Treatment: None    Smoking Cessation: Not Applicable    PROCEDURES   Unless otherwise noted above, none.  Procedures      CRITICAL CARE TIME   Patient does not meet Critical Care Time, 0 minutes    ED IMPRESSION     1. COVID-19          DISPOSITION/PLAN   DISPOSITION Decision To Discharge 07/14/2024 03:22:05 PM    Discharge Note: The patient is stable for discharge home. The signs, symptoms, diagnosis, and discharge instructions have been discussed, understanding conveyed, and agreed upon. The patient is to follow up as recommended or return to ER should their symptoms worsen.      PATIENT REFERRED TO:  Jacquelyn Murcia MD  [address] [place], [place] [id number] [phone]              DISCHARGE MEDICATIONS:     Medication List        ASK your doctor about these medications      acetaminophen 650 MG extended release tablet  Commonly known as: Tylenol 8 Hour  Take 1 tablet by mouth every 8 hours as needed for Pain     aspirin 81 MG EC tablet     carvedilol 12.5 MG tablet  Commonly known as: COREG  Take 2 tablets by mouth 2 times daily (with meals)     PreserVision AREDS Caps     simvastatin 40 MG tablet  Commonly known as: ZOCOR  Take 1 tablet by mouth nightly                DISCONTINUED MEDICATIONS:  Discharge Medication List as of 7/14/2024  3:49 PM          I am the Primary Clinician of Record: Pablo Salmon MD (electronically signed)    (Please note that parts of this dictation were completed with voice recognition software. Quite often unanticipated grammatical, syntax,

## 2024-07-14 NOTE — ED TRIAGE NOTES
Pt arrives via EMS with complaints of AMS, fever and weakness all over, also experiencing N/V. Family gave tylenol prior to EMS arrival,  has been sick as well.

## 2024-07-19 ENCOUNTER — TELEPHONE (OUTPATIENT)
Facility: CLINIC | Age: 84
End: 2024-07-19

## 2024-07-19 NOTE — TELEPHONE ENCOUNTER
Patient tested positive for covid Sunday 07/14 at ED, did home test this AM and it's still positive. Patient still has cough and fatigue. No fever since Monday, no congestion. Pulse ox normal. Wasn't prescribed anything at the hospital. Told to take Tylenol and cold medication. Wants to know if there's anything else they can do..     Please advise.

## 2024-07-20 LAB
BACTERIA SPEC CULT: NORMAL
BACTERIA SPEC CULT: NORMAL
Lab: NORMAL
Lab: NORMAL

## 2024-12-20 LAB
BUN SERPL-MCNC: 15 MG/DL (ref 8–27)
BUN/CREAT SERPL: 15 (ref 12–28)
CALCIUM SERPL-MCNC: 9.8 MG/DL (ref 8.7–10.3)
CHLORIDE SERPL-SCNC: 104 MMOL/L (ref 96–106)
CHOLEST SERPL-MCNC: 201 MG/DL (ref 100–199)
CO2 SERPL-SCNC: 24 MMOL/L (ref 20–29)
CREAT SERPL-MCNC: 1 MG/DL (ref 0.57–1)
EGFRCR SERPLBLD CKD-EPI 2021: 55 ML/MIN/1.73
GLUCOSE SERPL-MCNC: 93 MG/DL (ref 70–99)
HDLC SERPL-MCNC: 66 MG/DL
LDLC SERPL CALC-MCNC: 122 MG/DL (ref 0–99)
POTASSIUM SERPL-SCNC: 4.6 MMOL/L (ref 3.5–5.2)
SODIUM SERPL-SCNC: 145 MMOL/L (ref 134–144)
TRIGL SERPL-MCNC: 69 MG/DL (ref 0–149)
VLDLC SERPL CALC-MCNC: 13 MG/DL (ref 5–40)

## 2024-12-26 ENCOUNTER — OFFICE VISIT (OUTPATIENT)
Facility: CLINIC | Age: 84
End: 2024-12-26

## 2024-12-26 VITALS
HEIGHT: 64 IN | OXYGEN SATURATION: 96 % | WEIGHT: 234.4 LBS | RESPIRATION RATE: 18 BRPM | BODY MASS INDEX: 40.02 KG/M2 | HEART RATE: 78 BPM | TEMPERATURE: 98 F | DIASTOLIC BLOOD PRESSURE: 66 MMHG | SYSTOLIC BLOOD PRESSURE: 114 MMHG

## 2024-12-26 DIAGNOSIS — Z85.3 HISTORY OF BREAST CANCER: ICD-10-CM

## 2024-12-26 DIAGNOSIS — Z85.09 HISTORY OF GASTROINTESTINAL STROMAL TUMOR (GIST): ICD-10-CM

## 2024-12-26 DIAGNOSIS — I10 PRIMARY HYPERTENSION: Primary | ICD-10-CM

## 2024-12-26 DIAGNOSIS — R63.5 WEIGHT GAIN: ICD-10-CM

## 2024-12-26 DIAGNOSIS — E78.00 PURE HYPERCHOLESTEROLEMIA: ICD-10-CM

## 2024-12-26 DIAGNOSIS — G62.9 NEUROPATHY: ICD-10-CM

## 2024-12-26 DIAGNOSIS — I25.2 HISTORY OF NON-ST ELEVATION MYOCARDIAL INFARCTION (NSTEMI): ICD-10-CM

## 2024-12-26 DIAGNOSIS — M15.0 PRIMARY OSTEOARTHRITIS INVOLVING MULTIPLE JOINTS: ICD-10-CM

## 2024-12-26 DIAGNOSIS — Z95.0 CARDIAC PACEMAKER IN SITU: ICD-10-CM

## 2024-12-26 RX ORDER — SIMVASTATIN 40 MG
40 TABLET ORAL NIGHTLY
Qty: 90 TABLET | Refills: 2 | Status: SHIPPED | OUTPATIENT
Start: 2024-12-26

## 2024-12-26 RX ORDER — CARVEDILOL 12.5 MG/1
25 TABLET ORAL 2 TIMES DAILY WITH MEALS
Qty: 180 TABLET | Refills: 2 | Status: SHIPPED | OUTPATIENT
Start: 2024-12-26

## 2024-12-26 ASSESSMENT — ENCOUNTER SYMPTOMS
GASTROINTESTINAL NEGATIVE: 1
RESPIRATORY NEGATIVE: 1
ALLERGIC/IMMUNOLOGIC NEGATIVE: 1

## 2024-12-26 NOTE — PROGRESS NOTES
Micki Webber (: 1940) is a 84 y.o. female, Established patient patient, here for evaluation of the following chief complaint(s):  Follow-up Chronic Condition         ASSESSMENT/PLAN:  Below is the assessment and plan developed based on review of pertinent history, physical exam, labs, studies, and medications.      1. Primary hypertension  -     Ambulatory Referral to Care Management  2. Pure hypercholesterolemia  -     Ambulatory Referral to Care Management  3. History of gastrointestinal stromal tumor (GIST)  -     Ambulatory Referral to Care Management  4. Primary osteoarthritis involving multiple joints  -     Ambulatory Referral to Care Management  5. History of breast cancer  -     Ambulatory Referral to Care Management  6. Cardiac pacemaker in situ  -     Ambulatory Referral to Care Management  7. History of non-ST elevation myocardial infarction (NSTEMI)  -     Ambulatory Referral to Care Management  8. Neuropathy  9. BMI 37.0-37.9, adult  10. BMI 40.0-44.9, adult  -     Ambulatory Referral to Care Management  11. Weight gain  -     Ambulatory Referral to Care Management    Ms. Webber came with her son and daughter for regular follow-up.  She is walking with walker.  Her son has come from [place] and her daughter has come from [facility].    Ms. Webber acknowledges that she has gained weight and she keeps eating because she has nothing to do at home and now she is not driving and she sleeps a lot in a daytime.  Discussed about all behavioral changes how she can make small changes like not to buy unhealthy food and not to drink sugar containing beverages including regular soda or juice sweet tea and not to eat desserts cookies unhealthy snacks including but not limited to chips and crackers and cookies and hot dog and hamburger and also to limit the intake of starch and sugar in the diet and not to eat saturated fat and trans fat containing the food and she should avoid sugar containing fruits

## 2024-12-26 NOTE — PROGRESS NOTES
\"Have you been to the ER, urgent care clinic since your last visit?  Hospitalized since your last visit?\"    YES - When: approximately 07/14/2024 ago.  Where and Why: [facility] ER for covid. Seen at [facility] in Sept for Bursitis.     “Have you seen or consulted any other health care providers outside of [facility] since your last visit?”    YES - When: approximately Sept 2024 ago.  Where and Why: Dr. Hernadez for a pacemaker check.    Chief Complaint   Patient presents with    Follow-up Chronic Condition     /75 (Site: Right Upper Arm, Position: Sitting, Cuff Size: Medium Adult)   Pulse 79   Temp 98 °F (36.7 °C) (Temporal)   Resp 18   Ht 1.626 m (5' 4\")   Wt 106.3 kg (234 lb 6.4 oz)   SpO2 96%   BMI 40.23 kg/m²           Click Here for Release of Records Request

## 2025-01-10 ENCOUNTER — OFFICE VISIT (OUTPATIENT)
Age: 85
End: 2025-01-10
Payer: MEDICARE

## 2025-01-10 VITALS
WEIGHT: 233 LBS | HEART RATE: 73 BPM | BODY MASS INDEX: 39.78 KG/M2 | SYSTOLIC BLOOD PRESSURE: 131 MMHG | RESPIRATION RATE: 20 BRPM | HEIGHT: 64 IN | OXYGEN SATURATION: 94 % | DIASTOLIC BLOOD PRESSURE: 88 MMHG | TEMPERATURE: 98.2 F

## 2025-01-10 DIAGNOSIS — I87.303 CHRONIC VENOUS HYPERTENSION INVOLVING BOTH SIDES: Primary | ICD-10-CM

## 2025-01-10 PROCEDURE — 1123F ACP DISCUSS/DSCN MKR DOCD: CPT | Performed by: SURGERY

## 2025-01-10 PROCEDURE — 1090F PRES/ABSN URINE INCON ASSESS: CPT | Performed by: SURGERY

## 2025-01-10 PROCEDURE — G8427 DOCREV CUR MEDS BY ELIG CLIN: HCPCS | Performed by: SURGERY

## 2025-01-10 PROCEDURE — 1126F AMNT PAIN NOTED NONE PRSNT: CPT | Performed by: SURGERY

## 2025-01-10 PROCEDURE — G8417 CALC BMI ABV UP PARAM F/U: HCPCS | Performed by: SURGERY

## 2025-01-10 PROCEDURE — 1036F TOBACCO NON-USER: CPT | Performed by: SURGERY

## 2025-01-10 PROCEDURE — 1160F RVW MEDS BY RX/DR IN RCRD: CPT | Performed by: SURGERY

## 2025-01-10 PROCEDURE — 1159F MED LIST DOCD IN RCRD: CPT | Performed by: SURGERY

## 2025-01-10 PROCEDURE — 99212 OFFICE O/P EST SF 10 MIN: CPT | Performed by: SURGERY

## 2025-01-10 ASSESSMENT — PATIENT HEALTH QUESTIONNAIRE - PHQ9
SUM OF ALL RESPONSES TO PHQ QUESTIONS 1-9: 0
SUM OF ALL RESPONSES TO PHQ9 QUESTIONS 1 & 2: 0
1. LITTLE INTEREST OR PLEASURE IN DOING THINGS: NOT AT ALL
2. FEELING DOWN, DEPRESSED OR HOPELESS: NOT AT ALL

## 2025-01-10 NOTE — PROGRESS NOTES
Identified pt with two pt identifiers (name and ). Reviewed chart in preparation for visit and have obtained necessary documentation.    Micki Webber is a 84 y.o. female  Chief Complaint   Patient presents with    Follow-up     Ankle swelling left side      /88 (Site: Right Upper Arm, Position: Sitting, Cuff Size: Large Adult)   Pulse 73   Temp 98.2 °F (36.8 °C) (Oral)   Resp 20   Ht 1.626 m (5' 4\")   Wt 105.7 kg (233 lb)   SpO2 94%   BMI 39.99 kg/m²     1. Have you been to the ER, urgent care clinic since your last visit?  Hospitalized since your last visit?Yes    2. Have you seen or consulted any other health care providers outside of the [facility] since your last visit?  Include any pap smears or colon screening. no    
MD    Assessments:  Patient Active Problem List   Diagnosis    History of gastrointestinal stromal tumor (GIST)    Primary hypertension    Primary osteoarthritis involving multiple joints    History of breast cancer    Cardiac pacemaker in situ    Pure hypercholesterolemia    History of non-ST elevation myocardial infarction (NSTEMI)    Neuropathy    Menopausal and perimenopausal disorder    Cellulitis of left lower extremity    Rash and nonspecific skin eruption    BMI 37.0-37.9, adult    Chronic venous hypertension involving both sides       Plans: Patient has been compliant with compression Zipper however it appears the compression devices are not tight enough.  I suggest increased compression gradient to 20 to 30 mmHg.  And patient was encouraged to continue to be activity.  Will continue monitor her progress with conservative management for chronic venous hypertension.  And patient will be reassessed in 8 months follow-up.      **Note: This shaina is pertinent to patient with PAD.    Vascular risk factor modification regimen:  5 regimens include: Optimal cholesterol control, exercise program, medication modifications including antiplatelet therapy and antihypertensives, optimal blood pressure control, and optimal hydration.  6 regimens include: 5 regimens plus tobacco cessation.  7 regimens include: 5 regimens plus strict diabetic control.  8 regimens include: 6 plus 7 regimens    Capo Wells MD

## 2025-01-24 ENCOUNTER — HOSPITAL ENCOUNTER (OUTPATIENT)
Facility: HOSPITAL | Age: 85
Discharge: HOME | End: 2025-01-27
Attending: INTERNAL MEDICINE
Payer: MEDICARE

## 2025-01-24 DIAGNOSIS — Z12.31 ENCOUNTER FOR SCREENING MAMMOGRAM FOR MALIGNANT NEOPLASM OF BREAST: ICD-10-CM

## 2025-01-24 PROCEDURE — 77067 SCR MAMMO BI INCL CAD: CPT

## 2025-05-22 ENCOUNTER — TELEPHONE (OUTPATIENT)
Facility: CLINIC | Age: 85
End: 2025-05-22

## 2025-05-22 SDOH — HEALTH STABILITY: PHYSICAL HEALTH: ON AVERAGE, HOW MANY DAYS PER WEEK DO YOU ENGAGE IN MODERATE TO STRENUOUS EXERCISE (LIKE A BRISK WALK)?: 0 DAYS

## 2025-05-22 ASSESSMENT — LIFESTYLE VARIABLES
HOW MANY STANDARD DRINKS CONTAINING ALCOHOL DO YOU HAVE ON A TYPICAL DAY: PATIENT DOES NOT DRINK
HOW MANY STANDARD DRINKS CONTAINING ALCOHOL DO YOU HAVE ON A TYPICAL DAY: 0
HOW OFTEN DO YOU HAVE SIX OR MORE DRINKS ON ONE OCCASION: 1
HOW OFTEN DO YOU HAVE A DRINK CONTAINING ALCOHOL: 1
HOW OFTEN DO YOU HAVE A DRINK CONTAINING ALCOHOL: NEVER

## 2025-05-22 ASSESSMENT — PATIENT HEALTH QUESTIONNAIRE - PHQ9
SUM OF ALL RESPONSES TO PHQ QUESTIONS 1-9: 0
SUM OF ALL RESPONSES TO PHQ QUESTIONS 1-9: 0
2. FEELING DOWN, DEPRESSED OR HOPELESS: NOT AT ALL
SUM OF ALL RESPONSES TO PHQ QUESTIONS 1-9: 0
SUM OF ALL RESPONSES TO PHQ QUESTIONS 1-9: 0
1. LITTLE INTEREST OR PLEASURE IN DOING THINGS: NOT AT ALL

## 2025-05-27 ENCOUNTER — OFFICE VISIT (OUTPATIENT)
Facility: CLINIC | Age: 85
End: 2025-05-27
Payer: MEDICARE

## 2025-05-27 VITALS
OXYGEN SATURATION: 97 % | WEIGHT: 234 LBS | TEMPERATURE: 97.9 F | DIASTOLIC BLOOD PRESSURE: 72 MMHG | RESPIRATION RATE: 20 BRPM | HEIGHT: 64 IN | BODY MASS INDEX: 39.95 KG/M2 | SYSTOLIC BLOOD PRESSURE: 120 MMHG | HEART RATE: 72 BPM

## 2025-05-27 DIAGNOSIS — E78.00 PURE HYPERCHOLESTEROLEMIA: ICD-10-CM

## 2025-05-27 DIAGNOSIS — I10 PRIMARY HYPERTENSION: ICD-10-CM

## 2025-05-27 DIAGNOSIS — Z85.09 HISTORY OF GASTROINTESTINAL STROMAL TUMOR (GIST): ICD-10-CM

## 2025-05-27 DIAGNOSIS — M15.0 PRIMARY OSTEOARTHRITIS INVOLVING MULTIPLE JOINTS: ICD-10-CM

## 2025-05-27 DIAGNOSIS — Z85.3 HISTORY OF BREAST CANCER: ICD-10-CM

## 2025-05-27 DIAGNOSIS — Z95.0 CARDIAC PACEMAKER IN SITU: ICD-10-CM

## 2025-05-27 DIAGNOSIS — Z00.00 MEDICARE ANNUAL WELLNESS VISIT, SUBSEQUENT: ICD-10-CM

## 2025-05-27 DIAGNOSIS — I25.2 HISTORY OF NON-ST ELEVATION MYOCARDIAL INFARCTION (NSTEMI): ICD-10-CM

## 2025-05-27 DIAGNOSIS — G47.30 SLEEP APNEA, UNSPECIFIED TYPE: ICD-10-CM

## 2025-05-27 PROCEDURE — 1090F PRES/ABSN URINE INCON ASSESS: CPT | Performed by: INTERNAL MEDICINE

## 2025-05-27 PROCEDURE — 1125F AMNT PAIN NOTED PAIN PRSNT: CPT | Performed by: INTERNAL MEDICINE

## 2025-05-27 PROCEDURE — G8427 DOCREV CUR MEDS BY ELIG CLIN: HCPCS | Performed by: INTERNAL MEDICINE

## 2025-05-27 PROCEDURE — 1160F RVW MEDS BY RX/DR IN RCRD: CPT | Performed by: INTERNAL MEDICINE

## 2025-05-27 PROCEDURE — G8417 CALC BMI ABV UP PARAM F/U: HCPCS | Performed by: INTERNAL MEDICINE

## 2025-05-27 PROCEDURE — 99214 OFFICE O/P EST MOD 30 MIN: CPT | Performed by: INTERNAL MEDICINE

## 2025-05-27 PROCEDURE — 1159F MED LIST DOCD IN RCRD: CPT | Performed by: INTERNAL MEDICINE

## 2025-05-27 PROCEDURE — 1036F TOBACCO NON-USER: CPT | Performed by: INTERNAL MEDICINE

## 2025-05-27 PROCEDURE — 1123F ACP DISCUSS/DSCN MKR DOCD: CPT | Performed by: INTERNAL MEDICINE

## 2025-05-27 PROCEDURE — G0439 PPPS, SUBSEQ VISIT: HCPCS | Performed by: INTERNAL MEDICINE

## 2025-05-27 RX ORDER — AMMONIUM LACTATE 12 G/100G
LOTION TOPICAL
COMMUNITY
Start: 2025-05-07

## 2025-05-27 ASSESSMENT — ENCOUNTER SYMPTOMS
ALLERGIC/IMMUNOLOGIC NEGATIVE: 1
RESPIRATORY NEGATIVE: 1
GASTROINTESTINAL NEGATIVE: 1

## 2025-05-27 NOTE — PROGRESS NOTES
Medicare Annual Wellness Visit    Micki Webber is here for Follow-up Chronic Condition and Medicare AWV    Assessment & Plan   Medicare annual wellness visit, subsequent  Primary hypertension  -     TSH reflex to T4F; Future  Pure hypercholesterolemia  -     Comprehensive Metabolic Panel; Future  -     CBC with Auto Differential; Future  BMI 40.0-44.9, adult (HCC)  -     TSH reflex to T4F; Future  Sleep apnea, unspecified type  -     [facility] - Alexander Coker MD, Pulmonology, [place]  History of breast cancer  History of gastrointestinal stromal tumor (GIST)  History of non-ST elevation myocardial infarction (NSTEMI)  Primary osteoarthritis involving multiple joints  Cardiac pacemaker in situ       Ms. Webber is brought by her daughter for combined visit.    Hypertension, well-controlled, continue current dose of carvedilol.    Hypercholesteremia getting simvastatin.  Diet low in saturated fat.    She is a breast cancer survivor and she had surgery for GIST tumor    She had a history of non-ST elevation MI and sinus arrhythmia and having cardiac pacemaker follows cardiologist    Sleep apnea not wearing CPAP machine refer her to pulmonologist Dr. Mclean    BMI 40.15 she is cannot do much walking or ambulation and she has been motivated to take low-calorie diet and not to drink sugar containing beverages and avoid sugar and starch in the diet and saturated fat in the diet and she is not able to change her dietary habits in the past she used to see [facility] diet/weight loss program that she had lost but her  buys everything who ambulates but she is just sitting and sleeping a lot and not wearing CPAP machine.  She has no depression.  She has an excellent support from her daughter and son who are going to change her habits for diet.  Motivated her.  She cannot do water aerobics.    She is using cane.    Labs ordered.  No memory problems.  She says she is going to take shingles shot and she had taken COVID shot mRNA on

## 2025-05-27 NOTE — PATIENT INSTRUCTIONS
Medicines    Take your medicines exactly as prescribed. Call your doctor if you think you are having a problem with your medicine.     If your doctor recommends aspirin, take the amount directed each day. Make sure you take aspirin and not another kind of pain reliever, such as acetaminophen (Tylenol).   When should you call for help?   Call 911 if you have symptoms of a heart attack. These may include:    Chest pain or pressure, or a strange feeling in the chest.     Sweating.     Shortness of breath.     Pain, pressure, or a strange feeling in the back, neck, jaw, or upper belly or in one or both shoulders or arms.     Lightheadedness or sudden weakness.     A fast or irregular heartbeat.   After you call 911, the  may tell you to chew 1 adult-strength or 2 to 4 low-dose aspirin. Wait for an ambulance. Do not try to drive yourself.  Watch closely for changes in your health, and be sure to contact your doctor if you have any problems.  Where can you learn more?  Go to https://www.[employer].net/patientEd and enter F075 to learn more about \"A Healthy Heart: Care Instructions.\"  Current as of: July 31, 2024  Content Version: 14.4  © 2024-2025 [employer].   Care instructions adapted under license by [employer]. If you have questions about a medical condition or this instruction, always ask your healthcare professional. [employer], disclaims any warranty or liability for your use of this information.    Personalized Preventive Plan for Micki Webber - 5/27/2025  Medicare offers a range of preventive health benefits. Some of the tests and screenings are paid in full while other may be subject to a deductible, co-insurance, and/or copay.  Some of these benefits include a comprehensive review of your medical history including lifestyle, illnesses that may run in your family, and various assessments and screenings as appropriate.  After reviewing your medical record and screening and

## 2025-05-27 NOTE — PROGRESS NOTES
Chief Complaint   Patient presents with    Follow-up Chronic Condition    Medicare AWV     /86 (BP Site: Right Upper Arm, Patient Position: Sitting)   Pulse 69   Temp 97.9 °F (36.6 °C) (Oral)   Resp 20   Ht 1.626 m (5' 4.02\")   Wt 106.1 kg (234 lb)   SpO2 97%   BMI 40.15 kg/m²         \"Have you been to the ER, urgent care clinic since your last visit?  Hospitalized since your last visit?\"    NO    “Have you seen or consulted any other health care providers outside our system since your last visit?”    NO

## 2025-05-28 LAB
ALBUMIN SERPL-MCNC: 4.4 G/DL (ref 3.7–4.7)
ALP SERPL-CCNC: 83 IU/L (ref 44–121)
ALT SERPL-CCNC: 17 IU/L (ref 0–32)
AST SERPL-CCNC: 23 IU/L (ref 0–40)
BASOPHILS # BLD AUTO: 0 X10E3/UL (ref 0–0.2)
BASOPHILS NFR BLD AUTO: 0 %
BILIRUB SERPL-MCNC: 0.6 MG/DL (ref 0–1.2)
BUN SERPL-MCNC: 17 MG/DL (ref 8–27)
BUN/CREAT SERPL: 17 (ref 12–28)
CALCIUM SERPL-MCNC: 9.9 MG/DL (ref 8.7–10.3)
CHLORIDE SERPL-SCNC: 102 MMOL/L (ref 96–106)
CO2 SERPL-SCNC: 26 MMOL/L (ref 20–29)
CREAT SERPL-MCNC: 1.01 MG/DL (ref 0.57–1)
EGFRCR SERPLBLD CKD-EPI 2021: 54 ML/MIN/1.73
EOSINOPHIL # BLD AUTO: 0.2 X10E3/UL (ref 0–0.4)
EOSINOPHIL NFR BLD AUTO: 2 %
ERYTHROCYTE [DISTWIDTH] IN BLOOD BY AUTOMATED COUNT: 15.1 % (ref 11.7–15.4)
GLOBULIN SER CALC-MCNC: 2.3 G/DL (ref 1.5–4.5)
GLUCOSE SERPL-MCNC: 95 MG/DL (ref 70–99)
HCT VFR BLD AUTO: 43.4 % (ref 34–46.6)
HGB BLD-MCNC: 13.3 G/DL (ref 11.1–15.9)
IMM GRANULOCYTES # BLD AUTO: 0 X10E3/UL (ref 0–0.1)
IMM GRANULOCYTES NFR BLD AUTO: 0 %
LYMPHOCYTES # BLD AUTO: 1.2 X10E3/UL (ref 0.7–3.1)
LYMPHOCYTES NFR BLD AUTO: 17 %
MCH RBC QN AUTO: 24.1 PG (ref 26.6–33)
MCHC RBC AUTO-ENTMCNC: 30.6 G/DL (ref 31.5–35.7)
MCV RBC AUTO: 79 FL (ref 79–97)
MONOCYTES # BLD AUTO: 0.5 X10E3/UL (ref 0.1–0.9)
MONOCYTES NFR BLD AUTO: 7 %
NEUTROPHILS # BLD AUTO: 5.1 X10E3/UL (ref 1.4–7)
NEUTROPHILS NFR BLD AUTO: 74 %
PLATELET # BLD AUTO: 145 X10E3/UL (ref 150–450)
POTASSIUM SERPL-SCNC: 4.4 MMOL/L (ref 3.5–5.2)
PROT SERPL-MCNC: 6.7 G/DL (ref 6–8.5)
RBC # BLD AUTO: 5.52 X10E6/UL (ref 3.77–5.28)
SODIUM SERPL-SCNC: 141 MMOL/L (ref 134–144)
TSH SERPL DL<=0.005 MIU/L-ACNC: 0.97 UIU/ML (ref 0.45–4.5)
WBC # BLD AUTO: 7 X10E3/UL (ref 3.4–10.8)

## 2025-05-29 ENCOUNTER — RESULTS FOLLOW-UP (OUTPATIENT)
Facility: CLINIC | Age: 85
End: 2025-05-29

## 2025-06-05 ENCOUNTER — TELEPHONE (OUTPATIENT)
Dept: PHARMACY | Facility: CLINIC | Age: 85
End: 2025-06-05

## 2025-06-05 NOTE — TELEPHONE ENCOUNTER
CLINICAL PHARMACY NOTE - [facility] Referral    Patient can be scheduled with:  Team Schedule- General Referrals, etc.    Received a referral from: Care Coordinator to discuss patient’s medications. Called patient to schedule a time to speak with a pharmacist over the telephone.   Spoke to patient and advised them of the above message.  Patient verified understanding and scheduled their appointment: 6/9/25 at 9AM    Patient is located in [place].  Please schedule Monday-Thursday 8AM-4PM with Lu Recinos or Tata for licensing purposes.      Stefany Ortiz [facility].   [facility] Clinical   [facility] Clinical [facility]acy  Toll free: [phone] Option 1    For Pharmacy Admin Tracking Only    Program: [facility] [employer]  CPA in place:  No  Recommendation Provided To: Patient/Caregiver: 1 via Telephone  Intervention Detail: Scheduled Appointment  Intervention Accepted By: Patient/Caregiver: 1  Gap Closed?: Yes   Time Spent (min): 15

## 2025-06-05 NOTE — TELEPHONE ENCOUNTER
----- Message from DANIELLE NARAYAN RN sent at 6/5/2025  2:22 PM EDT -----  Regarding: Referral  Patient could benefit from a referral to the Ambulatory Clinical Pharmacy Team for a medication review due to reported intermittent medication non-adherence; patient is agreeable to this referral. Referral sent today; patient notified.  Thank you,   Dorota Crump RN [facility]

## 2025-06-06 ENCOUNTER — CARE COORDINATION (OUTPATIENT)
Dept: CARE COORDINATION | Age: 85
End: 2025-06-06

## 2025-06-06 NOTE — CARE COORDINATION
[facility]  Primary hypertension, BMI 40.0-44.9     RD contacted Micki Webber regarding Dietitian referral. Pt answered, RD explained reason for call and role in care. Pt requested RD call back on a different day. Patient prefers a call on Thursday 6/19/25 in the morning. RD will outreach as requested and follow up as appropriate.       Nga Dewey RDN, LD  [phone]

## 2025-06-09 ENCOUNTER — TELEPHONE (OUTPATIENT)
Facility: CLINIC | Age: 85
End: 2025-06-09

## 2025-06-09 RX ORDER — CARVEDILOL 12.5 MG/1
12.5 TABLET ORAL 2 TIMES DAILY WITH MEALS
Qty: 180 TABLET | Refills: 2 | Status: SHIPPED | OUTPATIENT
Start: 2025-06-09

## 2025-06-09 NOTE — TELEPHONE ENCOUNTER
[facility] CLINICAL PHARMACY REVIEW: REFERRAL    Referral Type:  Medication adherence  Referral received via: Staff/Message  Referral: \"medication review due to reported intermittent medication non-adherence\"   Also as per 6/5/25 CC note: \"Patient reports that she sometimes takes her medications as prescribed. Patient identified forgetfulness as a barrier to consistent medication adherence. Patient reports having a weekly pill organizer but is not currently using it for medication management. Additionally, patient reports that her daughter calls her every morning at 10 AM to remind her to take her medications; however, patient states that if she has not eaten yet, she will delay taking her medications and often forgets to take them afterward. Patient's daughter lives in [facility] and is unable to assist with medication dispensing or setting up the weekly pill organizer.\"    Medications Reviewed with:  patient . Spoke with patient, states she recognizes she is having some memory problems, that if she doesn't take her medication right away, she sometimes lets herself get distracted and then forgets to come back and take them. Reports her daughter has the Echo/Shoshana set to remind her to take her medications ~10a and 10p. Patient does fill her own pill organizer each week, has one for AM and one for PM. States when she misses a dose, she moves those out so she doesn't know come the end of the week how many she may have missed. Thinks most often if she misses, she just takes it later in the day (ie her AM ones at 1p instead of AM), so that she does catch up.  is out and about, so not around during the day to help or remind her. Shoshana also tells her what day it is (to compare to her pill organizer day).     [x] Allergies reviewed  No Known Allergies    Current Medications:  Current Outpatient Medications   Medication Sig Dispense Refill    ammonium lactate (LAC-HYDRIN) 12 % lotion APPLY THIN LAYER

## 2025-06-09 NOTE — TELEPHONE ENCOUNTER
Noted patient's carvedilol rx updated, thank you    =======================================================   For Pharmacy Admin Tracking Only    Program: [facility]  CPA in place:  No  Recommendation Provided To: Provider: 1 via Note to Provider and Patient/Caregiver: 1 via Telephone  Intervention Detail: Adherence Monitorin  Intervention Accepted By: Provider: 1 and Patient/Caregiver: 1  Gap Closed?: Yes   Time Spent (min):  40

## 2025-06-09 NOTE — TELEPHONE ENCOUNTER
Jacquelyn Murcia MD, can we update carvedilol rx to how patient has been taking?  - Currently carvedilol 12.5mg, 2 tabs BID, however patient has been taking 1 tab BID (per her report, as well as reflected in cardiology/CareEverywhere and reconcile dispense hx)    Thank you!  Thank you,  Grisel Fleming, PharmD, [facility] Center[facility]  Population Health Pharmacist  [facility] Clinical Pharmacy  Department, toll free: [phone], option 1

## 2025-06-19 ENCOUNTER — CARE COORDINATION (OUTPATIENT)
Dept: CARE COORDINATION | Age: 85
End: 2025-06-19

## 2025-06-19 NOTE — CARE COORDINATION
Micki Webber  June 19, 2025    Initial Referral Reason: Hypertension     Patient Care Team:  Jacquelyn Murcia MD as PCP - General  Jacquelyn Murcia MD as PCP - Empaneled Provider  Dorota Crump RN as Ambulatory Care Manager  Nga Dewey RD, LD as Dietitian (Dietitian Registered)  Kathy Vieyra MSW as  (, Master Conditional Clinical (LMSW-CC))    Past Medical History:    Current Outpatient Medications   Medication Sig Dispense Refill    Multiple Vitamins-Minerals (EYE VITAMINS PO) Take 1 tablet by mouth 2 times daily Eye Health Complex      Multiple Vitamin (MULTIVITAMIN ADULT PO) Take 1 tablet by mouth daily      carvedilol (COREG) 12.5 MG tablet Take 1 tablet by mouth 2 times daily (with meals) 180 tablet 2    ammonium lactate (LAC-HYDRIN) 12 % lotion APPLY THIN LAYER ONCE A DAY FOR DRY SKIN TO BOTH FEET      simvastatin (ZOCOR) 40 MG tablet Take 1 tablet by mouth nightly 90 tablet 2    aspirin 81 MG EC tablet Take 1 tablet by mouth daily       No current facility-administered medications for this visit.       Biochemical Data, Medical Tests and Procedures:    No results found for: \"LABA1C\"  No results found for: \"EAG\"    Lab Results   Component Value Date    CHOL 201 (H) 12/19/2024    CHOL 185 02/17/2023    CHOL 180 06/22/2022     Lab Results   Component Value Date    TRIG 69 12/19/2024    TRIG 81 02/17/2023    TRIG 92 06/22/2022     Lab Results   Component Value Date    HDL 66 12/19/2024    HDL 57 02/17/2023    HDL 58 06/22/2022       Anthropometric Measurements:    Height: 64.02 inches (162.6 cm)  Weight: 234 lb (106.1 kg)  BMI: 40.15 (obesity class III)  IBW: 120 lb (54.5 kg) +-10%  %IBW: 195%   AdBW: 166 lb (75.5 kg)    Physical Exam Findings:  Deferred    Nutrition Interview: RD called pt, explained reason for call and role in care. Pt states she typically eats 3 meals/day. See food recall below. Explained the importance of eating 3 meals/day and making meals balanced

## 2025-07-10 ENCOUNTER — CARE COORDINATION (OUTPATIENT)
Dept: CARE COORDINATION | Age: 85
End: 2025-07-10

## 2025-07-10 NOTE — CARE COORDINATION
Micki Webber  7/10/2025    Registered Dietitian Progress Note for Care Coordination    Assessment: Micki is a 85 y.o. female.  RD referred for Hypertension. RD spoke with patient for initial nutrition assessment on 6/19/25. RD called to follow up with pt today 7/10/25. Patient states she received the handouts in the mail- per patient, she has skimmed through them but she needs to read them more in detail. RD discussed previous goals with pt. Reviewed the importance of eating 3 meals/day and making meals balanced using MyPlate. Patient states yesterday she ate a salad with chopped meat and fruit (cantaloupe and watermelon). Patient states her  does the grocery shopping and he buys the cheapest items. RD recommended using the low sodium food list to make a grocery list to ensure her  is buying lower sodium options. Discussed limiting sodium from food and beverages to no more than 2000 mg per day. Patient states she is trying to cut back on using the salt shaker and has been using black pepper. RD reviewed sodium free flavoring tips. Patient states her ankles are swollen (per patient \"leg puffs up around ankle\") and her leg is hurting. No weight provided per patient. Patient states she plans to have her  take her to the walk in clinic today. RD notified ACM.  Patient has no nutrition related questions/concerns at this time.     Barriers to meeting goals: overwhelmed by complexity of regimen, stress, and lack of education    Nutrition Monitoring and Evaluation  Indicator/Goal Criteria Progress   #1 Eat balanced meals consistently throughout the day.   #1 Focus on eating 3 meals/day and make these meals balanced using the MyPlate reference-1/2 plate fruits and/or vegetables, 1/4 plate protein and 1/4 plate starchy carbohydrates with 8 oz glass of low fat milk if desired.  #1 Patient is trying to eat 3 meals/day. Patient will focus on making meals more balanced using MyPlate and watch portion

## 2025-07-29 ENCOUNTER — CARE COORDINATION (OUTPATIENT)
Dept: CARE COORDINATION | Age: 85
End: 2025-07-29

## 2025-07-29 NOTE — CARE COORDINATION
Micki Webber  7/29/2025    Registered Dietitian Progress Note for Care Coordination    Assessment: Micki is a 85 y.o. female. RD referred for Hypertension. RD spoke with patient for initial nutrition assessment on 6/19/25 and has been following up with patient. RD called to follow up with pt today 7/29/25. RD discussed previous goals with pt. Patient states she was out of town recently and she \"needs to get back on track.\" Patient states she found the papers and needs to read through them- RD encouraged patient to take the time to read nutrition information. RD reiterated the importance of following a low sodium diet closely. Reviewed foods high in sodium to avoid/limiting eating. Discussed reading food labels and watching portion sizes. Recommended using foods list on handout to make a grocery list. Patient has no nutrition related questions/concerns at this time.     Barriers to meeting goals: overwhelmed by complexity of regimen, stress, and lack of education     Nutrition Monitoring and Evaluation  Indicator/Goal Criteria Progress   #1 Eat balanced meals consistently throughout the day.   #1 Focus on eating 3 meals/day and make these meals balanced using the MyPlate reference-1/2 plate fruits and/or vegetables, 1/4 plate protein and 1/4 plate starchy carbohydrates with 8 oz glass of low fat milk if desired.  #1 Patient is trying to eat 3 meals/day. Patient will focus on making meals more balanced using MyPlate and watch portion sizes.    #2  Monitor daily sodium intake. Keep sodium from food and beverages to no more than 2000 mg/day.  #2 Avoid the salt shaker. Read food labels to help choose lower sodium options. Watch portion sizes.  #2 Patient will stop using the salt shaker and kosher salt. Patient will use sodium free alternatives for flavoring food. Patient will have  buy lower sodium options at the store.       Plan of Care:  RD encouraged pt to keep working toward goals set. RD will follow up

## 2025-08-14 ENCOUNTER — OFFICE VISIT (OUTPATIENT)
Age: 85
End: 2025-08-14
Payer: MEDICARE

## 2025-08-14 VITALS
BODY MASS INDEX: 39.27 KG/M2 | HEIGHT: 64 IN | HEART RATE: 85 BPM | DIASTOLIC BLOOD PRESSURE: 83 MMHG | OXYGEN SATURATION: 95 % | RESPIRATION RATE: 16 BRPM | TEMPERATURE: 97.7 F | SYSTOLIC BLOOD PRESSURE: 128 MMHG | WEIGHT: 230 LBS

## 2025-08-14 DIAGNOSIS — I87.303 CHRONIC VENOUS HYPERTENSION INVOLVING BOTH SIDES: Primary | ICD-10-CM

## 2025-08-14 PROCEDURE — 99212 OFFICE O/P EST SF 10 MIN: CPT | Performed by: SURGERY

## 2025-08-14 PROCEDURE — 1090F PRES/ABSN URINE INCON ASSESS: CPT | Performed by: SURGERY

## 2025-08-14 PROCEDURE — 1126F AMNT PAIN NOTED NONE PRSNT: CPT | Performed by: SURGERY

## 2025-08-14 PROCEDURE — G8417 CALC BMI ABV UP PARAM F/U: HCPCS | Performed by: SURGERY

## 2025-08-14 PROCEDURE — 1036F TOBACCO NON-USER: CPT | Performed by: SURGERY

## 2025-08-14 PROCEDURE — 1123F ACP DISCUSS/DSCN MKR DOCD: CPT | Performed by: SURGERY

## 2025-08-14 PROCEDURE — G8427 DOCREV CUR MEDS BY ELIG CLIN: HCPCS | Performed by: SURGERY

## 2025-08-14 ASSESSMENT — PATIENT HEALTH QUESTIONNAIRE - PHQ9
2. FEELING DOWN, DEPRESSED OR HOPELESS: NOT AT ALL
1. LITTLE INTEREST OR PLEASURE IN DOING THINGS: NOT AT ALL
SUM OF ALL RESPONSES TO PHQ QUESTIONS 1-9: 0

## 2025-08-19 ENCOUNTER — CARE COORDINATION (OUTPATIENT)
Dept: CARE COORDINATION | Age: 85
End: 2025-08-19

## 2025-08-28 ENCOUNTER — HOSPITAL ENCOUNTER (OUTPATIENT)
Facility: HOSPITAL | Age: 85
Discharge: HOME | End: 2025-08-31
Payer: MEDICARE

## 2025-08-28 DIAGNOSIS — R06.02 SOB (SHORTNESS OF BREATH): ICD-10-CM

## 2025-08-28 PROCEDURE — 71046 X-RAY EXAM CHEST 2 VIEWS: CPT
